# Patient Record
Sex: MALE | Race: WHITE | Employment: FULL TIME | URBAN - METROPOLITAN AREA
[De-identification: names, ages, dates, MRNs, and addresses within clinical notes are randomized per-mention and may not be internally consistent; named-entity substitution may affect disease eponyms.]

---

## 2018-03-14 ENCOUNTER — OFFICE VISIT (OUTPATIENT)
Dept: ORTHOPEDIC SURGERY | Age: 48
End: 2018-03-14

## 2018-03-14 VITALS
WEIGHT: 203.93 LBS | DIASTOLIC BLOOD PRESSURE: 77 MMHG | HEART RATE: 85 BPM | BODY MASS INDEX: 28.55 KG/M2 | SYSTOLIC BLOOD PRESSURE: 115 MMHG | HEIGHT: 71 IN

## 2018-03-14 DIAGNOSIS — M25.571 RIGHT ANKLE PAIN, UNSPECIFIED CHRONICITY: Primary | ICD-10-CM

## 2018-03-14 PROCEDURE — 99213 OFFICE O/P EST LOW 20 MIN: CPT | Performed by: PHYSICIAN ASSISTANT

## 2018-03-14 NOTE — PROGRESS NOTES
Subjective:       Jamie Juan is a 49 y.o. male No ref. provider found   for evaluation and treatment of an injury to the right ankle. This is evaluated as a personal injury. The injury occurred 6 hours ago. The patient states the ankle rolled inward at the time of injury. He did hear or sense a pop or snap at the time of the injury. The patient notes pain and mild swelling of the ankle since the injury. Pain is localized to the lateral  malleolar area. Description of injury/pain: Stepped out of his truck today and twisted his ankle. Immediate pain but has improved since injury. No n/t in the foot or leg. No knee pain. Moderate swelling noted. .         Patient's medications, allergies, past medical, surgical, social and family histories were reviewed and updated as appropriate. The pain assessment was noted & is as follows:        Objective:   Admission weight: 203 lb 14.8 oz (92.5 kg)  5' 10.98\" (180.3 cm)   Blood pressure 115/77, pulse 85, height 5' 10.98\" (1.803 m), weight 203 lb 14.8 oz (92.5 kg). General :    alert, appears stated age and cooperative   Gait:  Abnormal. The patient can bear weight on the injured extremity. Right Ankle  Proximal Fibula:   no tenderness noted   Edema:   moderate swelling of the lateral surface   Ecchymosis:   is observed in the right lateral  malleolus, right talofibular ligament    Active ROM:  50% of normal    Passive ROM:   50% of normal    Palpation:  moderate tenderness of the lateral surface   Stability.:   no joint laxity. Drawer sign equal to unaffected ankle.    Syndosmosis:   syndesmotic ligament is not tender   Sensation:    intact to light touch   Pulses:  normal DP and PT pulses     Contralateral Exam:  -No obvious deformities  -No abrasions or cellulitis noted, NVI   -Full ROM   -No joint laxity  -no palpable tenderness noted    Imaging  X-ray of the ankle/foot:   XR ANKLE RIGHT (MIN 3 VIEWS)   Final Result        no fracture or dislocation noted, soft tissue swelling    Assessment:     1: right grade 2 ATF sprain     Plan:      I discussed the following treatment options/plan of care:  rest the injured area as much as practical, apply ice packs, elevate the injured limb, compressive bandage  F/U 2 weeks for recheck if no better. No orders of the defined types were placed in this encounter.

## 2018-03-23 ENCOUNTER — TELEPHONE (OUTPATIENT)
Dept: ORTHOPEDIC SURGERY | Age: 48
End: 2018-03-23

## 2021-04-02 ENCOUNTER — HOSPITAL ENCOUNTER (INPATIENT)
Age: 51
LOS: 3 days | Discharge: HOME | DRG: 246 | End: 2021-04-05
Attending: EMERGENCY MEDICINE | Admitting: INTERNAL MEDICINE
Payer: COMMERCIAL

## 2021-04-02 ENCOUNTER — APPOINTMENT (OUTPATIENT)
Dept: CARDIAC CATH/INVASIVE PROCEDURES | Age: 51
DRG: 246 | End: 2021-04-02
Payer: COMMERCIAL

## 2021-04-02 ENCOUNTER — APPOINTMENT (OUTPATIENT)
Dept: GENERAL RADIOLOGY | Age: 51
DRG: 246 | End: 2021-04-02
Payer: COMMERCIAL

## 2021-04-02 DIAGNOSIS — I21.02 ST ELEVATION MYOCARDIAL INFARCTION INVOLVING LEFT ANTERIOR DESCENDING (LAD) CORONARY ARTERY (HCC): ICD-10-CM

## 2021-04-02 DIAGNOSIS — I21.3 ST ELEVATION MYOCARDIAL INFARCTION (STEMI), UNSPECIFIED ARTERY (HCC): Primary | ICD-10-CM

## 2021-04-02 LAB
A/G RATIO: 1.7 (ref 1.1–2.2)
ALBUMIN SERPL-MCNC: 4.3 G/DL (ref 3.4–5)
ALBUMIN SERPL-MCNC: 4.5 G/DL (ref 3.4–5)
ALP BLD-CCNC: 46 U/L (ref 40–129)
ALT SERPL-CCNC: 65 U/L (ref 10–40)
ANION GAP SERPL CALCULATED.3IONS-SCNC: 11 MMOL/L (ref 3–16)
ANION GAP SERPL CALCULATED.3IONS-SCNC: 16 MMOL/L (ref 3–16)
AST SERPL-CCNC: 49 U/L (ref 15–37)
BANDED NEUTROPHILS RELATIVE PERCENT: 1 % (ref 0–7)
BASOPHILS ABSOLUTE: 0.1 K/UL (ref 0–0.2)
BASOPHILS RELATIVE PERCENT: 1 %
BILIRUB SERPL-MCNC: 0.6 MG/DL (ref 0–1)
BUN BLDV-MCNC: 14 MG/DL (ref 7–20)
BUN BLDV-MCNC: 15 MG/DL (ref 7–20)
CALCIUM SERPL-MCNC: 9 MG/DL (ref 8.3–10.6)
CALCIUM SERPL-MCNC: 9.2 MG/DL (ref 8.3–10.6)
CHLORIDE BLD-SCNC: 100 MMOL/L (ref 99–110)
CHLORIDE BLD-SCNC: 100 MMOL/L (ref 99–110)
CO2: 23 MMOL/L (ref 21–32)
CO2: 24 MMOL/L (ref 21–32)
CREAT SERPL-MCNC: 0.8 MG/DL (ref 0.9–1.3)
CREAT SERPL-MCNC: 1 MG/DL (ref 0.9–1.3)
EKG ATRIAL RATE: 101 BPM
EKG ATRIAL RATE: 85 BPM
EKG ATRIAL RATE: 87 BPM
EKG DIAGNOSIS: NORMAL
EKG P AXIS: 34 DEGREES
EKG P AXIS: 39 DEGREES
EKG P-R INTERVAL: 158 MS
EKG P-R INTERVAL: 160 MS
EKG Q-T INTERVAL: 382 MS
EKG Q-T INTERVAL: 402 MS
EKG Q-T INTERVAL: 404 MS
EKG QRS DURATION: 106 MS
EKG QRS DURATION: 148 MS
EKG QRS DURATION: 94 MS
EKG QTC CALCULATION (BAZETT): 454 MS
EKG QTC CALCULATION (BAZETT): 483 MS
EKG QTC CALCULATION (BAZETT): 529 MS
EKG R AXIS: 83 DEGREES
EKG R AXIS: 84 DEGREES
EKG R AXIS: 98 DEGREES
EKG T AXIS: -23 DEGREES
EKG T AXIS: 29 DEGREES
EKG T AXIS: 91 DEGREES
EKG VENTRICULAR RATE: 103 BPM
EKG VENTRICULAR RATE: 85 BPM
EKG VENTRICULAR RATE: 87 BPM
EOSINOPHILS ABSOLUTE: 0.1 K/UL (ref 0–0.6)
EOSINOPHILS RELATIVE PERCENT: 2 %
GFR AFRICAN AMERICAN: >60
GFR AFRICAN AMERICAN: >60
GFR NON-AFRICAN AMERICAN: >60
GFR NON-AFRICAN AMERICAN: >60
GLOBULIN: 2.6 G/DL
GLUCOSE BLD-MCNC: 122 MG/DL (ref 70–99)
GLUCOSE BLD-MCNC: 150 MG/DL (ref 70–99)
HCT VFR BLD CALC: 42.5 % (ref 40.5–52.5)
HEMOGLOBIN: 15.1 G/DL (ref 13.5–17.5)
LYMPHOCYTES ABSOLUTE: 2.6 K/UL (ref 1–5.1)
LYMPHOCYTES RELATIVE PERCENT: 50 %
MAGNESIUM: 1.8 MG/DL (ref 1.8–2.4)
MAGNESIUM: 2.5 MG/DL (ref 1.8–2.4)
MCH RBC QN AUTO: 34.6 PG (ref 26–34)
MCHC RBC AUTO-ENTMCNC: 35.5 G/DL (ref 31–36)
MCV RBC AUTO: 97.4 FL (ref 80–100)
MONOCYTES ABSOLUTE: 0.3 K/UL (ref 0–1.3)
MONOCYTES RELATIVE PERCENT: 6 %
NEUTROPHILS ABSOLUTE: 2.1 K/UL (ref 1.7–7.7)
NEUTROPHILS RELATIVE PERCENT: 40 %
OVALOCYTES: ABNORMAL
PDW BLD-RTO: 15.1 % (ref 12.4–15.4)
PHOSPHORUS: 2.8 MG/DL (ref 2.5–4.9)
PLATELET # BLD: 260 K/UL (ref 135–450)
PMV BLD AUTO: 7.9 FL (ref 5–10.5)
POTASSIUM REFLEX MAGNESIUM: 3.3 MMOL/L (ref 3.5–5.1)
POTASSIUM SERPL-SCNC: 4.7 MMOL/L (ref 3.5–5.1)
RBC # BLD: 4.37 M/UL (ref 4.2–5.9)
SODIUM BLD-SCNC: 135 MMOL/L (ref 136–145)
SODIUM BLD-SCNC: 139 MMOL/L (ref 136–145)
TOTAL PROTEIN: 6.9 G/DL (ref 6.4–8.2)
TROPONIN: 3.01 NG/ML
TROPONIN: 7.46 NG/ML
TROPONIN: <0.01 NG/ML
VITAMIN D 25-HYDROXY: 27 NG/ML
WBC # BLD: 5.1 K/UL (ref 4–11)

## 2021-04-02 PROCEDURE — 85347 COAGULATION TIME ACTIVATED: CPT

## 2021-04-02 PROCEDURE — 2580000003 HC RX 258: Performed by: INTERNAL MEDICINE

## 2021-04-02 PROCEDURE — 92978 ENDOLUMINL IVUS OCT C 1ST: CPT | Performed by: INTERNAL MEDICINE

## 2021-04-02 PROCEDURE — 4A023N8 MEASUREMENT OF CARDIAC SAMPLING AND PRESSURE, BILATERAL, PERCUTANEOUS APPROACH: ICD-10-PCS | Performed by: INTERNAL MEDICINE

## 2021-04-02 PROCEDURE — 93005 ELECTROCARDIOGRAM TRACING: CPT

## 2021-04-02 PROCEDURE — 2500000003 HC RX 250 WO HCPCS

## 2021-04-02 PROCEDURE — 93005 ELECTROCARDIOGRAM TRACING: CPT | Performed by: INTERNAL MEDICINE

## 2021-04-02 PROCEDURE — 6360000002 HC RX W HCPCS

## 2021-04-02 PROCEDURE — 99152 MOD SED SAME PHYS/QHP 5/>YRS: CPT | Performed by: INTERNAL MEDICINE

## 2021-04-02 PROCEDURE — 93458 L HRT ARTERY/VENTRICLE ANGIO: CPT

## 2021-04-02 PROCEDURE — 80053 COMPREHEN METABOLIC PANEL: CPT

## 2021-04-02 PROCEDURE — 2709999900 HC NON-CHARGEABLE SUPPLY

## 2021-04-02 PROCEDURE — C1725 CATH, TRANSLUMIN NON-LASER: HCPCS

## 2021-04-02 PROCEDURE — 93458 L HRT ARTERY/VENTRICLE ANGIO: CPT | Performed by: INTERNAL MEDICINE

## 2021-04-02 PROCEDURE — 2720000010 HC SURG SUPPLY STERILE

## 2021-04-02 PROCEDURE — 6360000002 HC RX W HCPCS: Performed by: EMERGENCY MEDICINE

## 2021-04-02 PROCEDURE — 6370000000 HC RX 637 (ALT 250 FOR IP): Performed by: EMERGENCY MEDICINE

## 2021-04-02 PROCEDURE — 92978 ENDOLUMINL IVUS OCT C 1ST: CPT

## 2021-04-02 PROCEDURE — 99152 MOD SED SAME PHYS/QHP 5/>YRS: CPT

## 2021-04-02 PROCEDURE — 71045 X-RAY EXAM CHEST 1 VIEW: CPT

## 2021-04-02 PROCEDURE — 6360000002 HC RX W HCPCS: Performed by: INTERNAL MEDICINE

## 2021-04-02 PROCEDURE — 92973 PRQ TRLUML C MCHN ASP THRMBC: CPT

## 2021-04-02 PROCEDURE — 92941 PRQ TRLML REVSC TOT OCCL AMI: CPT | Performed by: INTERNAL MEDICINE

## 2021-04-02 PROCEDURE — 36415 COLL VENOUS BLD VENIPUNCTURE: CPT

## 2021-04-02 PROCEDURE — 93010 ELECTROCARDIOGRAM REPORT: CPT | Performed by: INTERNAL MEDICINE

## 2021-04-02 PROCEDURE — 82306 VITAMIN D 25 HYDROXY: CPT

## 2021-04-02 PROCEDURE — 5A2204Z RESTORATION OF CARDIAC RHYTHM, SINGLE: ICD-10-PCS | Performed by: INTERNAL MEDICINE

## 2021-04-02 PROCEDURE — C1874 STENT, COATED/COV W/DEL SYS: HCPCS

## 2021-04-02 PROCEDURE — B221Z2Z COMPUTERIZED TOMOGRAPHY (CT SCAN) OF MULTIPLE CORONARY ARTERIES USING INTRAVASCULAR OPTICAL COHERENCE: ICD-10-PCS | Performed by: INTERNAL MEDICINE

## 2021-04-02 PROCEDURE — 83735 ASSAY OF MAGNESIUM: CPT

## 2021-04-02 PROCEDURE — 93005 ELECTROCARDIOGRAM TRACING: CPT | Performed by: EMERGENCY MEDICINE

## 2021-04-02 PROCEDURE — 027034Z DILATION OF CORONARY ARTERY, ONE ARTERY WITH DRUG-ELUTING INTRALUMINAL DEVICE, PERCUTANEOUS APPROACH: ICD-10-PCS | Performed by: INTERNAL MEDICINE

## 2021-04-02 PROCEDURE — 2000000000 HC ICU R&B

## 2021-04-02 PROCEDURE — 6370000000 HC RX 637 (ALT 250 FOR IP): Performed by: INTERNAL MEDICINE

## 2021-04-02 PROCEDURE — C1887 CATHETER, GUIDING: HCPCS

## 2021-04-02 PROCEDURE — 6360000004 HC RX CONTRAST MEDICATION

## 2021-04-02 PROCEDURE — 85025 COMPLETE CBC W/AUTO DIFF WBC: CPT

## 2021-04-02 PROCEDURE — 99153 MOD SED SAME PHYS/QHP EA: CPT

## 2021-04-02 PROCEDURE — 99291 CRITICAL CARE FIRST HOUR: CPT | Performed by: INTERNAL MEDICINE

## 2021-04-02 PROCEDURE — 99285 EMERGENCY DEPT VISIT HI MDM: CPT

## 2021-04-02 PROCEDURE — C1769 GUIDE WIRE: HCPCS

## 2021-04-02 PROCEDURE — B2151ZZ FLUOROSCOPY OF LEFT HEART USING LOW OSMOLAR CONTRAST: ICD-10-PCS | Performed by: INTERNAL MEDICINE

## 2021-04-02 PROCEDURE — 92941 PRQ TRLML REVSC TOT OCCL AMI: CPT

## 2021-04-02 PROCEDURE — B2111ZZ FLUOROSCOPY OF MULTIPLE CORONARY ARTERIES USING LOW OSMOLAR CONTRAST: ICD-10-PCS | Performed by: INTERNAL MEDICINE

## 2021-04-02 PROCEDURE — 99284 EMERGENCY DEPT VISIT MOD MDM: CPT

## 2021-04-02 PROCEDURE — 96375 TX/PRO/DX INJ NEW DRUG ADDON: CPT

## 2021-04-02 PROCEDURE — 96374 THER/PROPH/DIAG INJ IV PUSH: CPT

## 2021-04-02 PROCEDURE — C1894 INTRO/SHEATH, NON-LASER: HCPCS

## 2021-04-02 PROCEDURE — 99255 IP/OBS CONSLTJ NEW/EST HI 80: CPT | Performed by: INTERNAL MEDICINE

## 2021-04-02 PROCEDURE — 84484 ASSAY OF TROPONIN QUANT: CPT

## 2021-04-02 RX ORDER — HEPARIN SODIUM 1000 [USP'U]/ML
4000 INJECTION, SOLUTION INTRAVENOUS; SUBCUTANEOUS ONCE
Status: DISCONTINUED | OUTPATIENT
Start: 2021-04-02 | End: 2021-04-02

## 2021-04-02 RX ORDER — HEPARIN SODIUM 1000 [USP'U]/ML
INJECTION, SOLUTION INTRAVENOUS; SUBCUTANEOUS
Status: COMPLETED | OUTPATIENT
Start: 2021-04-02 | End: 2021-04-02

## 2021-04-02 RX ORDER — HEPARIN SODIUM 1000 [USP'U]/ML
1000 INJECTION, SOLUTION INTRAVENOUS; SUBCUTANEOUS ONCE
Status: COMPLETED | OUTPATIENT
Start: 2021-04-02 | End: 2021-04-02

## 2021-04-02 RX ORDER — POTASSIUM CHLORIDE 20 MEQ/1
40 TABLET, EXTENDED RELEASE ORAL ONCE
Status: COMPLETED | OUTPATIENT
Start: 2021-04-02 | End: 2021-04-02

## 2021-04-02 RX ORDER — MIDAZOLAM HYDROCHLORIDE 5 MG/ML
INJECTION INTRAMUSCULAR; INTRAVENOUS
Status: COMPLETED | OUTPATIENT
Start: 2021-04-02 | End: 2021-04-02

## 2021-04-02 RX ORDER — CHLORAL HYDRATE 500 MG
CAPSULE ORAL DAILY
Status: ON HOLD | COMMUNITY
End: 2021-04-05 | Stop reason: HOSPADM

## 2021-04-02 RX ORDER — MORPHINE SULFATE 4 MG/ML
4 INJECTION, SOLUTION INTRAMUSCULAR; INTRAVENOUS ONCE
Status: COMPLETED | OUTPATIENT
Start: 2021-04-02 | End: 2021-04-02

## 2021-04-02 RX ORDER — HEPARIN SODIUM 1000 [USP'U]/ML
INJECTION, SOLUTION INTRAVENOUS; SUBCUTANEOUS
Status: DISCONTINUED
Start: 2021-04-02 | End: 2021-04-02

## 2021-04-02 RX ORDER — FENTANYL CITRATE 50 UG/ML
INJECTION, SOLUTION INTRAMUSCULAR; INTRAVENOUS
Status: COMPLETED | OUTPATIENT
Start: 2021-04-02 | End: 2021-04-02

## 2021-04-02 RX ORDER — MULTIVITAMIN
1 CAPSULE ORAL DAILY
COMMUNITY

## 2021-04-02 RX ORDER — ROSUVASTATIN CALCIUM 10 MG/1
20 TABLET, COATED ORAL NIGHTLY
Status: DISCONTINUED | OUTPATIENT
Start: 2021-04-02 | End: 2021-04-06 | Stop reason: HOSPADM

## 2021-04-02 RX ORDER — ASPIRIN 81 MG/1
81 TABLET, CHEWABLE ORAL DAILY
Status: DISCONTINUED | OUTPATIENT
Start: 2021-04-03 | End: 2021-04-06 | Stop reason: HOSPADM

## 2021-04-02 RX ORDER — SODIUM CHLORIDE 0.9 % (FLUSH) 0.9 %
10 SYRINGE (ML) INJECTION PRN
Status: DISCONTINUED | OUTPATIENT
Start: 2021-04-02 | End: 2021-04-06 | Stop reason: HOSPADM

## 2021-04-02 RX ORDER — SODIUM CHLORIDE 0.9 % (FLUSH) 0.9 %
10 SYRINGE (ML) INJECTION EVERY 12 HOURS SCHEDULED
Status: DISCONTINUED | OUTPATIENT
Start: 2021-04-02 | End: 2021-04-06 | Stop reason: HOSPADM

## 2021-04-02 RX ORDER — LISINOPRIL 5 MG/1
5 TABLET ORAL DAILY
Status: DISCONTINUED | OUTPATIENT
Start: 2021-04-02 | End: 2021-04-06 | Stop reason: HOSPADM

## 2021-04-02 RX ORDER — ONDANSETRON 2 MG/ML
4 INJECTION INTRAMUSCULAR; INTRAVENOUS EVERY 6 HOURS PRN
Status: DISCONTINUED | OUTPATIENT
Start: 2021-04-02 | End: 2021-04-06 | Stop reason: HOSPADM

## 2021-04-02 RX ORDER — EPTIFIBATIDE 0.75 MG/ML
2 INJECTION, SOLUTION INTRAVENOUS CONTINUOUS
Status: DISPENSED | OUTPATIENT
Start: 2021-04-02 | End: 2021-04-02

## 2021-04-02 RX ADMIN — EPTIFIBATIDE 2 MCG/KG/MIN: 0.75 INJECTION, SOLUTION INTRAVENOUS at 06:51

## 2021-04-02 RX ADMIN — MIDAZOLAM HYDROCHLORIDE 1 MG: 5 INJECTION INTRAMUSCULAR; INTRAVENOUS at 06:56

## 2021-04-02 RX ADMIN — MIDAZOLAM HYDROCHLORIDE 2 MG: 5 INJECTION INTRAMUSCULAR; INTRAVENOUS at 06:43

## 2021-04-02 RX ADMIN — ROSUVASTATIN CALCIUM 20 MG: 10 TABLET, FILM COATED ORAL at 20:39

## 2021-04-02 RX ADMIN — HEPARIN SODIUM 1000 UNITS: 1000 INJECTION, SOLUTION INTRAVENOUS; SUBCUTANEOUS at 06:03

## 2021-04-02 RX ADMIN — TICAGRELOR 90 MG: 90 TABLET ORAL at 20:38

## 2021-04-02 RX ADMIN — FENTANYL CITRATE 50 MCG: 50 INJECTION, SOLUTION INTRAMUSCULAR; INTRAVENOUS at 06:42

## 2021-04-02 RX ADMIN — TICAGRELOR 180 MG: 90 TABLET ORAL at 06:01

## 2021-04-02 RX ADMIN — METOPROLOL TARTRATE 25 MG: 25 TABLET, FILM COATED ORAL at 11:48

## 2021-04-02 RX ADMIN — MORPHINE SULFATE 4 MG: 4 INJECTION, SOLUTION INTRAMUSCULAR; INTRAVENOUS at 06:00

## 2021-04-02 RX ADMIN — HEPARIN SODIUM 1500 UNITS: 1000 INJECTION, SOLUTION INTRAVENOUS; SUBCUTANEOUS at 07:03

## 2021-04-02 RX ADMIN — LISINOPRIL 5 MG: 5 TABLET ORAL at 11:48

## 2021-04-02 RX ADMIN — ONDANSETRON HYDROCHLORIDE 4 MG: 2 SOLUTION INTRAMUSCULAR; INTRAVENOUS at 14:35

## 2021-04-02 RX ADMIN — FENTANYL CITRATE 25 MCG: 50 INJECTION, SOLUTION INTRAMUSCULAR; INTRAVENOUS at 07:02

## 2021-04-02 RX ADMIN — METOPROLOL TARTRATE 25 MG: 25 TABLET, FILM COATED ORAL at 20:39

## 2021-04-02 RX ADMIN — POTASSIUM CHLORIDE 40 MEQ: 1500 TABLET, EXTENDED RELEASE ORAL at 11:48

## 2021-04-02 RX ADMIN — SODIUM CHLORIDE, PRESERVATIVE FREE 10 ML: 5 INJECTION INTRAVENOUS at 14:37

## 2021-04-02 RX ADMIN — MIDAZOLAM HYDROCHLORIDE 1 MG: 5 INJECTION INTRAMUSCULAR; INTRAVENOUS at 07:02

## 2021-04-02 ASSESSMENT — PAIN SCALES - GENERAL
PAINLEVEL_OUTOF10: 10
PAINLEVEL_OUTOF10: 10

## 2021-04-02 NOTE — ED NOTES
[address] [facility] called  [address] returned page  4516 Clinical called to page CODE STEMI     Keren Alfaro  04/02/21 0546

## 2021-04-02 NOTE — ED PROVIDER NOTES
Patient arrived towards the end of my shift, however I was able to evaluate and hit him. We received a EKG prior to patient's arrival that was concerning for possible STEMI. Upon arrival, MS reports patient woke up with acute substernal chest pain. Patient took full dose aspirin prior to arrival and was given nitroglycerin on route. He states he has high blood pressure for which she takes medication for, negative cardiac history. Is not on a blood thinner. Vitals were stable. -Repeat EKG upon arrival here consistently shows elevation to anterior leads with reciprocal changes concerning for acute MI. Interventional cardiology was called, spoke with Dr. Baron Mancia who requested EKG be sent to him. In the meantime recommended activation of Cath Lab, 180 Brilinta, 4000 heparin and morphine. This was all given to the patient. After receiving the EKG, he states that it seems to be borderline, he is still on his way however is requesting any old EKG that we can find. I did send him the EKG that EMS had sent prior to his arrival.   Lab work as well as chest x-ray was ordered.  -Patient was handed off to Dr. Kaushik Steele, patient still in the ED awaiting interventional cardiology arrival to be taken to the Cath Lab.      Abelardo Mason MD  04/02/21 0045

## 2021-04-02 NOTE — OP NOTE
across AV None   Mitral regurg No signficant     Coronary Angiogram:  Artery Findings/Result   LM Ostial 30%   LAD Ostial calcium, 80% prox and 90% prox/mid and KAUR-1 flow   LCx Luminals  OM1- very large vessel, luminals       RCA Dominant, high downward takeoff, PDA kink gnosis         Results of intervention     Lesion 1: prox LAD  Stent: Resolute Blain 3.5 x 38  Pre:   90% stenosis, KAUR 1 flow  Post: 0% stenosis, KAUR 3 flow  Suspect Diag 1 jailed and lost    Assessment/Plan  1. Successful percutaneous intervention to the proximal and mid LAD for plaque rupture event. Doing 1 drug-eluting stent. This was further optimized by OCT imaging. 2.  Note there is a hazy spot in the ostial LAD that under OCT imaging was proven to be fibroatheromatous disease. There is no critical stenosis, no dissection no thrombus. 3.  Integrilin x6 hours. 4.  Aspirin 81 mg p.o. daily, ticagrelor 90 mg p.o. twice daily for 1 year minimum  5. We will start beta-blocker, high-dose statin, cardiac rehab phase 1 and 2, echocardiogram  6. Of note patient is on experimental chemotherapy we will therefore notify the study coordinators.   -Note this study drug supposedly affects QTC prolongation. I did not appreciate any QT prolongation on initial EKG but potassium was low at 3.3 will need to be repleted. Med Rec:   Recommendation Indicated?  Not Given Due To: Note   DAPT       STATIN - HIGH DOSE      BETA BLOCKER      ACE/ARB/ARNI      ALDACTONE          Electronically signed by Julita Zamora MD on 4/2/2021 at 7:58 AM

## 2021-04-02 NOTE — ED PROVIDER NOTES
Emergency Physician Note    Chief Complaint  Chest Pain       History of Present Illness  Jose Langford is a 45 y.o. male who presents to the ED for chest pain. Patient reports that he woke up about 40 minutes prior to arrival with severe substernal chest pain that does not radiate. He was diaphoretic and short of breath and nauseated. No cardiac history. He is on an experimental drug for multiple endocrine neoplasia. He takes that drug daily as prescribed. He also states he takes medicine for hypertension and hyperlipidemia as prescribed. No history of diabetes or smoking. No family history of early heart disease. He had a negative stress test 3 to 4 years ago by his recollection. He was transported by EMS and was given 325 mg aspirin and nitroglycerin in route. 10 systems reviewed, pertinent positives per HPI otherwise noted to be negative    I have reviewed the following from the nursing documentation:      Prior to Admission medications    Medication Sig Start Date End Date Taking?  Authorizing Provider   Multiple Vitamin (MULTIVITAMIN) capsule Take 1 capsule by mouth daily    Historical Provider, MD   Multiple Vitamins-Iron (MULTIPLE VITAMIN/IRON PO) Take by mouth    Historical Provider, MD   Omega-3 Fatty Acids (FISH OIL) 1000 MG CAPS by NOT APPLICABLE route    Historical Provider, MD       Allergies as of 04/02/2021 - Review Complete 04/02/2021   Allergen Reaction Noted    Zocor [simvastatin] Other (See Comments) 05/12/2016       Past Medical History:   Diagnosis Date    Endocrine cancer ([facility] [address]     Hyperlipidemia     Tumor cells 4/18/16    [place] Endocrine Tumor- in Stomach        Surgical History:   Past Surgical History:   Procedure Laterality Date    COLONOSCOPY  5/12/16    polyps    KNEE SURGERY  2012    LIVER BIOPSY  5/10/16    CT guided    UPPER GASTROINTESTINAL ENDOSCOPY  4/18/2016        Family History:    Family History   Problem Relation Age of Onset    High Cholesterol Mother    Maryuri Walker Heart Disease Mother     High Cholesterol Father     Asthma Sister     Cancer Maternal Grandfather         Possible leukemia       Social History     Socioeconomic History    Marital status:      Spouse name: Not on file    Number of children: Not on file    Years of education: Not on file    Highest education level: Not on file   Occupational History    Not on file   Social Needs    Financial resource strain: Not on file    Food insecurity     Worry: Not on file     Inability: Not on file   [employer] needs     Medical: Not on file     Non-medical: Not on file   Tobacco Use    Smoking status: Never Smoker    Smokeless tobacco: Never Used   Substance and Sexual Activity    Alcohol use: Yes     Alcohol/week: 0.0 standard drinks     Comment: socially weekends 5 beers on weekend    Drug use: No    Sexual activity: Not on file   Lifestyle    Physical activity     Days per week: Not on file     Minutes per session: Not on file    Stress: Not on file   Relationships    Social connections     Talks on phone: Not on file     Gets together: Not on file     Attends Congregation service: Not on file     Active member of club or organization: Not on file     Attends meetings of clubs or organizations: Not on file     Relationship status: Not on file    Intimate partner violence     Fear of current or ex partner: Not on file     Emotionally abused: Not on file     Physically abused: Not on file     Forced sexual activity: Not on file   Other Topics Concern    Not on file   Social History Narrative    Not on file       Nursing notes reviewed. ED Triage Vitals [04/02/21 0544]   Enc Vitals Group      BP (!) 149/113      Pulse 87      Resp 24      Temp 98 °F (36.7 °C)      Temp Source Oral      SpO2 99 %      Weight 212 lb (96.2 kg)      Height 5' 11\" (1.803 m)      Head Circumference       Peak Flow       Pain Score       Pain Loc       Pain Edu? Excl. in [address]? GENERAL:  Awake, alert. Well developed, well nourished with no apparent distress. HENT:  Normocephalic, Atraumatic, moist mucous membranes. EYES:  Pupils equal round and reactive to light, Conjunctiva normal, extraocular movements normal.  NECK:  No meningeal signs, Supple. CHEST:  Regular rate and rhythm, chest wall non-tender. LUNGS:  Clear to auscultation bilaterally. ABDOMEN:  Soft, non-tender, no rebound, rigidity or guarding, non-distended, normal bowel sounds. No costovertebral angle tenderness to palpation. BACK:  No tenderness. EXTREMITIES:  Normal range of motion, no edema, no bony tenderness, no deformity, distal pulses present. SKIN: Warm, dry and intact. NEUROLOGIC: Normal mental status. Moving all extremities to command. LABS and DIAGNOSTIC RESULTS  EKG  The Ekg interpreted by me shows  normal sinus rhythm with a rate of 87  Axis is   Normal  QTc is  normal  Intervals and Durations are unremarkable. ST Segments: elevation in  maggi-lateral leads  Delta waves, Brugada Syndrome, and Short [place] are not present. No prior EKG available for comparison. RADIOLOGY  X-RAYS:  I have reviewed radiologic plain film image(s). ALL OTHER NON-PLAIN FILM IMAGES SUCH AS CT, ULTRASOUND AND MRI HAVE BEEN READ BY THE RADIOLOGIST. XR CHEST PORTABLE   Final Result   Low lung volumes with no acute process identified.               LABS  Labs Reviewed   CBC WITH AUTO DIFFERENTIAL - Abnormal; Notable for the following components:       Result Value    MCH 34.6 (*)     All other components within normal limits    Narrative:     Performed at:  [address],  [place], [address] [email or web address]   Phone [phone]   COMPREHENSIVE METABOLIC PANEL W/ REFLEX TO MG FOR LOW K - Abnormal; Notable for the following components:    Potassium reflex Magnesium 3.3 (*)     Glucose 150 (*)     ALT 65 (*)     AST 49 (*)     All other components within normal limits    Narrative:     Performed at:  [facility]  [address],  [place], [place]   Phone [phone]   TROPONIN    Narrative:     Performed at:  [facility]  [address],  [place], [place]   Phone [phone]   MAGNESIUM    Narrative:     Performed at:  [facility]) [address],  [place], [place]   Phone [phone]       PROCEDURES  Cardiac defibrillation x6 for torsades de pointes. MEDICAL DECISION MAKING        Patient arrived shortly before 6 AM and the overnight physician, Dr. Rachel Flynn, initially evaluated him and obtained an EKG that showed ST elevation. She consulted Dr. Antwon Will and he was in route to the emergency department at the time I arrived. I assumed care at that moment. He had already received heparin and aspirin and nitroglycerin. When I entered the room I saw that his monitor was displaying multiple ventricular and I instructed the nurse to give 5 metoprolol ectopic beats and we opened up the crash cart. The patient already had pads in place. Shortly thereafter he went into a V. tach arrest and was defibrillated with 360 J synchronous cardioversion. This was immediately successful. Patient immediately returned with a pulse and mental status. He continued to talk and we continue to perform care for him. At around this time Dr. Antwon Will came to the bedside and advised giving lidocaine bolus followed by drip. I administered a 50 mg IV bolus of lidocaine and the nurse started a drip. We then also gave the patient some amiodarone. He proceeded then to have a total of 6 V. tach/torsades episodes which were treated with defibrillation successfully each time. During the course of this resuscitation he received 2g magnesium sulfate IV push as well as a total of 300 mg of amiodarone IV. He also received morphine and Zofran 4 mg apiece.   I considered whether or not to intubate the patient but given that his mental status returned to normal and he had respiratory reflexes intact and was in fact verbal between these episodes I elected against it because I felt that his cardiac stability could be harmed by this. Is very tenuous at that time. In my opinion nothing to delay his transfer to cardiac cath lab should be done. The total Critical Care time is 20 minutes which excludes separately billable procedures. The critical care was concerning management of torsade de pointes and STEMI with multiple IV medications. This time is exclusive of any time documented by any other providers. I spoke with Dr. Ismael Johnson. We thoroughly discussed the history, physical exam, laboratory and imaging studies, as well as, emergency department course. Based upon that discussion, we've decided to admit Marcin Lazos for further emergent cardiac catheterization. As I have deemed necessary from their history, physical, and studies, I have considered and evaluated Marcin Lazos for the following diagnoses:        FINAL IMPRESSION  1. ST elevation myocardial infarction (STEMI), unspecified artery ([address]        Vitals:  Blood pressure (!) 138/118, pulse 107, temperature 98 °F (36.7 °C), temperature source Oral, resp. rate 18, height 5' 11\" (1.803 m), weight 212 lb (96.2 kg), SpO2 99 %. Disposition  Pt is in critical condition upon Transfer to cath lab. This chart was generated using the [employer] dictation system. I created this record but it may contain dictation errors.           Linda Diaz MD  04/02/21 4934

## 2021-04-02 NOTE — H&P
Laterality Date    COLONOSCOPY  5/12/16    polyps    KNEE SURGERY  2012    LIVER BIOPSY  5/10/16    CT guided    UPPER GASTROINTESTINAL ENDOSCOPY  4/18/2016       Medications Prior to Admission:      Prior to Admission medications    Medication Sig Start Date End Date Taking? Authorizing Provider   Multiple Vitamin (MULTIVITAMIN) capsule Take 1 capsule by mouth daily    Historical Provider, MD   Multiple Vitamins-Iron (MULTIPLE VITAMIN/IRON PO) Take by mouth    Historical Provider, MD   Omega-3 Fatty Acids (FISH OIL) 1000 MG CAPS by NOT APPLICABLE route    Historical Provider, MD       Allergies:  Zocor [simvastatin]    Social History:      The patient currently lives at home    TOBACCO:   reports that he has never smoked. He has never used smokeless tobacco.  ETOH:   reports current alcohol use. E-Cigarettes/Vaping Use     Questions Responses    E-Cigarette/Vaping Use     Start Date     Passive Exposure     Quit Date     Counseling Given     Comments             Family History:      Reviewed in detail and negative for DM, CAD, Cancer, CVA. Positive as follows:        Problem Relation Age of Onset    High Cholesterol Mother     Heart Disease Mother     High Cholesterol Father     Asthma Sister     Cancer Maternal Grandfather         Possible leukemia       REVIEW OF SYSTEMS:   Pertinent positives as noted in the HPI. Chest pain. All other systems reviewed and negative. PHYSICAL EXAM PERFORMED:    BP (!) 138/118   Pulse 107   Temp 98 °F (36.7 °C) (Oral)   Resp 18   Ht 5' 11\" (1.803 m)   Wt 212 lb (96.2 kg)   SpO2 99%   BMI 29.57 kg/m²     General appearance:  No apparent distress, appears stated age and cooperative. HEENT:  Normal cephalic, atraumatic without obvious deformity. Pupils equal, round, and reactive to light. Extra ocular muscles intact. Conjunctivae/corneas clear. Neck: Supple, with full range of motion. No jugular venous distention. Trachea midline.   Respiratory:  Normal respiratory effort. Clear to auscultation, bilaterally without Rales/Wheezes/Rhonchi. Cardiovascular:  Regular rate and rhythm with normal S1/S2 without murmurs, rubs or gallops. Abdomen: Soft, non-tender, non-distended with normal bowel sounds. Musculoskeletal:  No clubbing, cyanosis or edema bilaterally. Full range of motion without deformity. Skin: Skin color, texture, turgor normal.  No rashes or lesions. Neurologic:  Neurovascularly intact without any focal sensory/motor deficits. Cranial nerves: II-XII intact, grossly non-focal.  Psychiatric:  Alert and oriented, thought content appropriate, normal insight  Capillary Refill: Brisk,< 3 seconds   Peripheral Pulses: +2 palpable, equal bilaterally       Labs:     Recent Labs     04/02/21  0557   WBC 5.1   HGB 15.1   HCT 42.5        Recent Labs     04/02/21  0557      K 3.3*      CO2 23   BUN 15   CREATININE 1.0   CALCIUM 9.2     Recent Labs     04/02/21  0557   AST 49*   ALT 65*   BILITOT 0.6   ALKPHOS 46     No results for input(s): INR in the last 72 hours. Recent Labs     04/02/21  0557   TROPONINI <0.01       Urinalysis:    No results found for: Joel Miss, [address], BACTERIA, RBCUA, BLOODU, [address], GLUCOSEU    Radiology:     CXR: I have reviewed the CXR with the following interpretation: Low lung volumes  EKG:  I have reviewed the EKG with the following interpretation: ST elevation myocardial infarction on arrival    XR CHEST PORTABLE   Final Result   Low lung volumes with no acute process identified.              ASSESSMENT:    Active Hospital Problems    Diagnosis Date Noted    [facility] [I21.3] 04/02/2021    Coronary artery disease [I25.10]     Ischemic cardiomyopathy [I25.5]     ST elevation myocardial infarction involving left anterior descending (LAD) coronary artery (HCC) [I21.02]     [facility] [C7A.8] 04/21/2016         PLAN:    ST elevation myocardial infarction  Had PCI with stent. Currently pain-free. Cardiology to follow for post intervention care. Medications are being adjusted. Will remain in the ICU at least overnight    Arrhythmia  Probably secondary to acute ischemia, possibly contributed by hypokalemia. Resolved with antiarrhythmics and cardioversion and has not recurred after PCI. Continue to monitor. Replete electrolytes as indicated. Ischemic cardiomyopathy  LAD plaque rupture noted. Left ventricular ejection fraction was around 20 to 25% during cardiac cath. Expected to improve after revascularization. Repeat echo tomorrow per cardiology. No current shortness of breath. Asymptomatic at this time    Neuroendocrine cancer  Patient is on experimental drug, which may prolong QTC according to preliminary information. Cardiologist has not noted QTC prolongation when he assessed the patient. At this point, continue same management per study protocol. We will not interfere. Discussed with cardiology    Discussed with the patient. Questions answered      DVT Prophylaxis: Lovenox after post procedure infusions are completed  Diet: DIET CARDIAC;  Code Status: Full Code    PT/OT Eval Status: Consider tomorrow, depending on overall strength. Dispo -inpatient stay. In the ICU at least overnight. Jarrod Kim MD    Thank you Zackery Madison for the opportunity to be involved in this patient's care. If you have any questions or concerns please feel free to contact me at [phone].

## 2021-04-02 NOTE — ED NOTES
Pt in torsades and fib rhythm. .  Given 2 grams mag. . pt was shocked 6 times during critical care treatment. .5 mg metoprolol. Elaina Sterling given 50 lidocaine and a lidocaine drip at 2 mg per minute was started. . for pain control a total of 8 mg of morphine was given. .Vance Katayama was given orally to pt who tolerated well. ..150 mg of amiodarone was given x2 . .4 mg of Zofran was given for nausea. Multiple iv access points were established.      Janett Hughes RN  04/02/21 166 Trena Sheffield RN  04/02/21 9173

## 2021-04-02 NOTE — CONSULTS
[facility]  [phone]      Attending Physician: Rahul Bonner MD  Reason for Consultation/Chief Complaint: STEMI    Subjective   History of Present Illness:  Marisol Cohen is a 45 y.o. patient who presented to the hospital with complaints of CP. Apparently patient has had no preceding symptoms. He had chest pain that woke him from sleep that was severe 10 out of 10 crushing in nature. He immediately called EMS. Patient was brought to [facility] in emergency lites and signed state. Of note patient's wife does work in our office and I contacted fellow colleagues who contacted me at home prior to the emergency department. I called the ER to obtain history from the overnight ER physician EKG was sent from his. I went ahead and activated the Cath Lab based on the above. In the emergency room once I arrived I found out patient had had one episode of torsades de pointes and cardioverted successfully without CPR. In the emergency room he went through a number of additional VT episodes and was cardioverted. He did not require any chest compressions. He did start lidocaine and amiodarone boluses and drips. He was given morphine for pain as he was shocked awake prior to him becoming unconscious. The Cath Lab staff arrived he was urgently rushed to the Cath Lab for evaluation      Past Medical History:   has a past medical history of Endocrine cancer ([address] Hyperlipidemia, and Tumor cells. Surgical History:   has a past surgical history that includes knee surgery (2012); Upper gastrointestinal endoscopy (4/18/2016); liver biopsy (5/10/16); and Colonoscopy (5/12/16). Social History:   reports that he has never smoked. He has never used smokeless tobacco. He reports current alcohol use. He reports that he does not use drugs.      Family History:  family history includes Asthma in his sister; Cancer in his maternal grandfather; Heart Disease in his mother; High Cholesterol in his father and mother. Home Medications:  Were reviewed and are listed in nursing record and/or below  Prior to Admission medications    Medication Sig Start Date End Date Taking? Authorizing Provider   Multiple Vitamin (MULTIVITAMIN) capsule Take 1 capsule by mouth daily    Historical Provider, MD   Multiple Vitamins-Iron (MULTIPLE VITAMIN/IRON PO) Take by mouth    Historical Provider, MD   Omega-3 Fatty Acids (FISH OIL) 1000 MG CAPS by NOT APPLICABLE route    Historical Provider, MD        CURRENT Medications:  heparin (porcine) injection 4,000 Units, Once  heparin (porcine) 1000 UNIT/ML injection,   eptifibatide (INTEGRILIN) 0.75 mg/mL infusion, Continuous  sodium chloride flush 0.9 % injection 10 mL, 2 times per day  sodium chloride flush 0.9 % injection 10 mL, PRN  metoprolol tartrate (LOPRESSOR) tablet 25 mg, BID  lisinopril (PRINIVIL;ZESTRIL) tablet 5 mg, Daily  rosuvastatin (CRESTOR) tablet 20 mg, Nightly  [START ON 4/3/2021] aspirin chewable tablet 81 mg, Daily  ticagrelor (BRILINTA) tablet 90 mg, BID        Allergies:  Zocor [simvastatin]     Review of Systems:   A 14 point review of symptoms completed. Pertinent positives identified in the HPI, all other review of symptoms negative as below.       Objective   PHYSICAL EXAM:    Vitals:    04/02/21 0804   BP: 120/77   Pulse: 88   Resp: 15   Temp: 98 °F (36.7 °C)   SpO2: 96%    Weight: 212 lb (96.2 kg)         General Appearance:  Alert, cooperative, moderate distress, appears stated age   Head:  Normocephalic, without obvious abnormality, atraumatic   Eyes:  PERRL, conjunctiva/corneas clear   Nose: Nares normal, no drainage or sinus tenderness   Throat: Lips, mucosa, and tongue normal   Neck: Supple, symmetrical, trachea midline, no adenopathy, thyroid: not enlarged, symmetric, no tenderness/mass/nodules, no carotid bruit or JVD   Lungs:   Clear to auscultation bilaterally, respirations unlabored   Chest Wall:  No deformity or tenderness Heart:  Regular rate and tacky rhythm, S1, S2 normal, no murmur, rub or gallop   Abdomen:   Soft, non-tender, bowel sounds active all four quadrants,  no masses, no organomegaly   Extremities: Extremities normal, atraumatic, no cyanosis or edema   Pulses: 2+ and symmetric   Skin: Skin color, texture, turgor normal, no rashes or lesions   Pysch: Normal mood and affect   Neurologic: Normal gross motor and sensory exam.         Labs   CBC:   Lab Results   Component Value Date    WBC 5.1 2021    RBC 4.37 2021    RBC 5.31 2016    HGB 15.1 2021    HCT 42.5 2021    MCV 97.4 2021    RDW 15.1 2021     2021     CMP:  Lab Results   Component Value Date     2021    K 3.3 2021     2021    CO2 23 2021    BUN 15 2021    CREATININE 1.0 2021    GFRAA >60 2021    AGRATIO 1.7 2021    LABGLOM >60 2021    GLUCOSE 150 2021    GLUCOSE 94 2016    PROT 6.9 2021    PROT 7.9 2016    CALCIUM 9.2 2021    BILITOT 0.6 2021    ALKPHOS 46 2021    AST 49 2021    ALT 65 2021     PT/INR:  No results found for: PTINR  HgBA1c:No results found for: LABA1C  Lab Results   Component Value Date    TROPONINI <0.01 2021         Cardiac Data     Last EK2021 at 05 45. Sinus rhythm with anterior ST elevation and some reciprocal depressions. Echo:    Stress Test:    Cath:    Studies:   CXR:Low lung volumes with no acute process identified. Assessment and Plan      1. Acute anterior MI  2. VT arrest  3. History of neuroendocrine tumor      Plan  1. Emergent left heart catheterization    Cardiology Consult ([id number]) Total critical care time was 46 minutes, excluding separately reportable procedures.  Services, included in critical care time were chart data review, documentation time, obtaining info from patient, review of nursing notes, and vital sign assessment and management of the patient. Performing defibrillation and ACLS in the emergency room as well as upon transport to the Cath Lab. Speaking with family. There was a high probability of clinically significant life-threatening deterioration in the patient's condition, which required my urgent intervention. Patient Active Problem List   Diagnosis    [facility]endocrine [facility] ([facility] [address]    Coronary artery disease    Ischemic cardiomyopathy    ST elevation myocardial infarction involving left anterior descending (LAD) coronary artery ([facility]    STEMI (ST elevation myocardial infarction) ([facility]           Thank you for allowing us to participate in the care of [employer]. Please call me with any questions [phone]. Miesha Meza MD, [facility] Cardiologist  [facility]  [phone] [facility]  [phone] [facility]  4/2/2021 8:25 AM    I will address the patient's cardiac risk factors and adjusted pharmacologic treatment as needed. In addition, I have reinforced the need for patient directed risk factor modification. All questions and concerns were addressed to the patient/family. Alternatives to my treatment were discussed. The note was completed using EMR. Every effort was made to ensure accuracy; however, inadvertent computerized transcription errors may be present.

## 2021-04-02 NOTE — PROGRESS NOTES
ACT drawn and resulted 159. Patient currently on integrilin gtt at 15.4  ML/hr. Patient instructed on removal procedure. Arterial sheath site without hematoma or oozing. Arterial sheath removed per policy without difficulty. Integrity of sheath inspected upon removal and no abnormalities noted. Manual pressure held X 20 minutes. Dry, sterile tegaderm applied. Patient tolerated well. Vital signs, groin checks, and pedal pulses will be completed per protocol (every 15 minutes X 4, every 30 minutes X 2, and every hour X 2 per protocol). Sheath removed by:  Tanya Boyd RN and Joselin Hodge RN.

## 2021-04-02 NOTE — PROGRESS NOTES
Note patient is on an investigational drug Cabozantinib. Per his wallet card this does show that is associated with changes in the CYP 3 A4 enzyme that can trigger QTC prolongation. Study Dr. Dr. Yvette Dutton was notified of this acute MI event. However the time of presentation his QTC was not prolonged on admission and does not appear prolonged now however I am awaiting a post MI EKG.

## 2021-04-03 LAB
ANION GAP SERPL CALCULATED.3IONS-SCNC: 11 MMOL/L (ref 3–16)
BUN BLDV-MCNC: 13 MG/DL (ref 7–20)
CALCIUM SERPL-MCNC: 9.1 MG/DL (ref 8.3–10.6)
CHLORIDE BLD-SCNC: 98 MMOL/L (ref 99–110)
CHOLESTEROL, TOTAL: 224 MG/DL (ref 0–199)
CO2: 27 MMOL/L (ref 21–32)
CREAT SERPL-MCNC: 0.9 MG/DL (ref 0.9–1.3)
GFR AFRICAN AMERICAN: >60
GFR NON-AFRICAN AMERICAN: >60
GLUCOSE BLD-MCNC: 156 MG/DL (ref 70–99)
HCT VFR BLD CALC: 45.1 % (ref 40.5–52.5)
HDLC SERPL-MCNC: 38 MG/DL (ref 40–60)
HEMOGLOBIN: 15.6 G/DL (ref 13.5–17.5)
LDL CHOLESTEROL CALCULATED: 152 MG/DL
LV EF: 33 %
LVEF MODALITY: NORMAL
MCH RBC QN AUTO: 34.4 PG (ref 26–34)
MCHC RBC AUTO-ENTMCNC: 34.6 G/DL (ref 31–36)
MCV RBC AUTO: 99.5 FL (ref 80–100)
PDW BLD-RTO: 15.5 % (ref 12.4–15.4)
PLATELET # BLD: 242 K/UL (ref 135–450)
PMV BLD AUTO: 7.5 FL (ref 5–10.5)
POTASSIUM SERPL-SCNC: 4.6 MMOL/L (ref 3.5–5.1)
RBC # BLD: 4.53 M/UL (ref 4.2–5.9)
SODIUM BLD-SCNC: 136 MMOL/L (ref 136–145)
TRIGL SERPL-MCNC: 170 MG/DL (ref 0–150)
TROPONIN: 5.39 NG/ML
VLDLC SERPL CALC-MCNC: 34 MG/DL
WBC # BLD: 7.3 K/UL (ref 4–11)

## 2021-04-03 PROCEDURE — C8929 TTE W OR WO FOL WCON,DOPPLER: HCPCS

## 2021-04-03 PROCEDURE — 80048 BASIC METABOLIC PNL TOTAL CA: CPT

## 2021-04-03 PROCEDURE — 6360000002 HC RX W HCPCS: Performed by: INTERNAL MEDICINE

## 2021-04-03 PROCEDURE — 99233 SBSQ HOSP IP/OBS HIGH 50: CPT | Performed by: INTERNAL MEDICINE

## 2021-04-03 PROCEDURE — 2000000000 HC ICU R&B

## 2021-04-03 PROCEDURE — 84484 ASSAY OF TROPONIN QUANT: CPT

## 2021-04-03 PROCEDURE — 6370000000 HC RX 637 (ALT 250 FOR IP): Performed by: INTERNAL MEDICINE

## 2021-04-03 PROCEDURE — 36415 COLL VENOUS BLD VENIPUNCTURE: CPT

## 2021-04-03 PROCEDURE — 2580000003 HC RX 258: Performed by: INTERNAL MEDICINE

## 2021-04-03 PROCEDURE — 85027 COMPLETE CBC AUTOMATED: CPT

## 2021-04-03 PROCEDURE — 80061 LIPID PANEL: CPT

## 2021-04-03 RX ADMIN — ENOXAPARIN SODIUM 40 MG: 40 INJECTION SUBCUTANEOUS at 12:33

## 2021-04-03 RX ADMIN — TICAGRELOR 90 MG: 90 TABLET ORAL at 21:34

## 2021-04-03 RX ADMIN — LISINOPRIL 5 MG: 5 TABLET ORAL at 08:40

## 2021-04-03 RX ADMIN — SODIUM CHLORIDE, PRESERVATIVE FREE 10 ML: 5 INJECTION INTRAVENOUS at 10:26

## 2021-04-03 RX ADMIN — MUPIROCIN: 20 OINTMENT TOPICAL at 08:41

## 2021-04-03 RX ADMIN — TICAGRELOR 90 MG: 90 TABLET ORAL at 08:40

## 2021-04-03 RX ADMIN — MUPIROCIN: 20 OINTMENT TOPICAL at 21:38

## 2021-04-03 RX ADMIN — METOPROLOL TARTRATE 25 MG: 25 TABLET, FILM COATED ORAL at 08:40

## 2021-04-03 RX ADMIN — ASPIRIN 81 MG: 81 TABLET, CHEWABLE ORAL at 08:40

## 2021-04-03 RX ADMIN — ROSUVASTATIN CALCIUM 20 MG: 10 TABLET, FILM COATED ORAL at 21:34

## 2021-04-03 RX ADMIN — SODIUM CHLORIDE, PRESERVATIVE FREE 10 ML: 5 INJECTION INTRAVENOUS at 21:38

## 2021-04-03 ASSESSMENT — PAIN SCALES - GENERAL: PAINLEVEL_OUTOF10: 0

## 2021-04-03 NOTE — PROGRESS NOTES
normal rate, normal S1S2, no murmur, rub or gallop  · Palpation:  Nl PMI  · JVP:  normal  · Extremities: no edema  · Cath site incision bandage and post-procedure stable. Normal pulse. No erythema or drainage  Abdomen:  · Soft, non-tender  · Normal bowel sounds  Extremities:  ·  No Cyanosis or Clubbing  Neurological/Psychiatric:  · Oriented to time, place, and person  · Non-anxious   Skin Warm and dry    Rhythm - NSR and no arrhthymias noted      Assessment: 1. Ischemic heart disease   ~post cath for anterior ST segment elevation myocardial infarction  2. Ischemic cardiomyopathy  3. Torsades    Plan:  1. The patient should be treated with the following   ~asa 81mg daily   ~beta-blockers to target HR 60-80 and -130   ~high intensity statin therapy with atorvastatin or rosuvastatin   ~anti-anginal therapy as necessary  2. DAPT as clinically indicated*  3. The patient was seen for >25 minutes. >50% of the time was devoted to giving the patient detailed instructions instructions on addressing diet, regular exercise, weight control, smoking abstention, medication compliance, and stress minimization. The patient was provided written and verbal instructions regarding risk factor modification. 4.  Echocardiogram  5. Correct any electrolyte issues with magnesium and potassium to goal      All questions and concerns were addressed to the patient/family. Alternatives to my treatment were discussed. The note was completed using EMR. Every effort was made to ensure accuracy; however, inadvertent computerized transcription errors may be present.     Dung Canales MD, FADUMO, [facility], [address] [place] [address]  4/3/2021 2:24 PM

## 2021-04-03 NOTE — PROGRESS NOTES
Hospitalist Progress Note      PCP: Nando Power    Date of Admission: 4/2/2021    Chief Complaint: Chest pain    Hospital Course: The patient woke up with crushing chest pain, around 5 AM.  Called 911 and was transported to [facility]. EKG at that time showed ST elevation myocardial infarction. While he was waiting to go to Cath Lab, patient developed arrhythmia. It was torsades and ventricular fibrillation. Had cardioversion six times. Was given magnesium metoprolol and lidocaine, then lidocaine drip was started. 150 mg of amiodarone was also administered. Once the heart rhythm stabilized, patient was taken to cardiac cath. Ruptured LAD plaque was diagnosed and patient had PCI followed by a drug-eluting stent insertion. Transferred to ICU for postprocedure care. Doing fine overnight. No evidence of QTC prolongation throughout the follow-up. Pain-free at the time of this visit    Subjective: No chest pain, no shortness of breath, no nausea or vomiting      Medications:  Reviewed    Infusion Medications   Scheduled Medications    sodium chloride flush  10 mL Intravenous 2 times per day    metoprolol tartrate  25 mg Oral BID    lisinopril  5 mg Oral Daily    rosuvastatin  20 mg Oral Nightly    aspirin  81 mg Oral Daily    ticagrelor  90 mg Oral BID    mupirocin   Nasal BID     PRN Meds: sodium chloride flush, ondansetron, perflutren lipid microspheres      Intake/Output Summary (Last 24 hours) at 4/3/2021 1112  Last data filed at 4/3/2021 0300  Gross per 24 hour   Intake 200 ml   Output 1600 ml   Net -1400 ml       Physical Exam Performed:    /72   Pulse 80   Temp 98.2 °F (36.8 °C) (Oral)   Resp 25   Ht 5' 11\" (1.803 m)   Wt 212 lb (96.2 kg)   SpO2 98%   BMI 29.57 kg/m²     General appearance: No apparent distress, appears stated age and cooperative. HEENT: Pupils equal, round, and reactive to light. Conjunctivae/corneas clear. Neck: Supple, with full range of motion. No jugular venous distention. Trachea midline. Respiratory:  Normal respiratory effort. Clear to auscultation, bilaterally without Rales/Wheezes/Rhonchi. Cardiovascular: Regular rate and rhythm with normal S1/S2 without murmurs, rubs or gallops. Abdomen: Soft, non-tender, non-distended with normal bowel sounds. Musculoskeletal: No clubbing, cyanosis or edema bilaterally. Full range of motion without deformity. Skin: Skin color, texture, turgor normal.  No rashes or lesions. Neurologic:  Neurovascularly intact without any focal sensory/motor deficits. Cranial nerves: II-XII intact, grossly non-focal.  Psychiatric: Alert and oriented, thought content appropriate, normal insight  Capillary Refill: Brisk,< 3 seconds   Peripheral Pulses: +2 palpable, equal bilaterally       Labs:   Recent Labs     04/02/21  0557 04/03/21  0431   WBC 5.1 7.3   HGB 15.1 15.6   HCT 42.5 45.1    242     Recent Labs     04/02/21  0557 04/02/21  1424 04/03/21  0431    135* 136   K 3.3* 4.7 4.6    100 98*   CO2 23 24 27   BUN 15 14 13   CREATININE 1.0 0.8* 0.9   CALCIUM 9.2 9.0 9.1   PHOS  --  2.8  --      Recent Labs     04/02/21  0557   AST 49*   ALT 65*   BILITOT 0.6   ALKPHOS 46     No results for input(s): INR in the last 72 hours. Recent Labs     04/02/21  0855 04/02/21  1424 04/03/21  0431   TROPONINI 3.01* 7.46* 5.39*       Urinalysis:    No results found for: NITRU, WBCUA, BACTERIA, RBCUA, BLOODU, SPECGRAV, GLUCOSEU    Radiology:  XR CHEST PORTABLE   Final Result   Low lung volumes with no acute process identified.                  Assessment/Plan:    Active Hospital Problems    Diagnosis    [facility] [I21.3]    Coronary artery disease [I25.10]    Ischemic cardiomyopathy [I25.5]    ST elevation myocardial infarction involving left anterior descending (LAD) coronary artery (HCC) [I21.02]    [facility]end[facility] [C7A.8]     PLAN      ST elevation myocardial

## 2021-04-04 PROCEDURE — 99291 CRITICAL CARE FIRST HOUR: CPT | Performed by: INTERNAL MEDICINE

## 2021-04-04 PROCEDURE — 6370000000 HC RX 637 (ALT 250 FOR IP): Performed by: INTERNAL MEDICINE

## 2021-04-04 PROCEDURE — 2580000003 HC RX 258: Performed by: INTERNAL MEDICINE

## 2021-04-04 PROCEDURE — 6360000002 HC RX W HCPCS: Performed by: INTERNAL MEDICINE

## 2021-04-04 PROCEDURE — 2000000000 HC ICU R&B

## 2021-04-04 RX ADMIN — SODIUM CHLORIDE, PRESERVATIVE FREE 10 ML: 5 INJECTION INTRAVENOUS at 08:58

## 2021-04-04 RX ADMIN — ENOXAPARIN SODIUM 40 MG: 40 INJECTION SUBCUTANEOUS at 08:58

## 2021-04-04 RX ADMIN — METOPROLOL TARTRATE 25 MG: 25 TABLET, FILM COATED ORAL at 20:41

## 2021-04-04 RX ADMIN — MUPIROCIN: 20 OINTMENT TOPICAL at 20:41

## 2021-04-04 RX ADMIN — LISINOPRIL 5 MG: 5 TABLET ORAL at 08:58

## 2021-04-04 RX ADMIN — ASPIRIN 81 MG: 81 TABLET, CHEWABLE ORAL at 08:57

## 2021-04-04 RX ADMIN — TICAGRELOR 90 MG: 90 TABLET ORAL at 08:58

## 2021-04-04 RX ADMIN — TICAGRELOR 90 MG: 90 TABLET ORAL at 20:41

## 2021-04-04 RX ADMIN — MUPIROCIN: 20 OINTMENT TOPICAL at 08:58

## 2021-04-04 RX ADMIN — SODIUM CHLORIDE, PRESERVATIVE FREE 10 ML: 5 INJECTION INTRAVENOUS at 20:41

## 2021-04-04 RX ADMIN — METOPROLOL TARTRATE 25 MG: 25 TABLET, FILM COATED ORAL at 08:58

## 2021-04-04 RX ADMIN — ROSUVASTATIN CALCIUM 20 MG: 10 TABLET, FILM COATED ORAL at 20:41

## 2021-04-04 ASSESSMENT — PAIN SCALES - GENERAL: PAINLEVEL_OUTOF10: 0

## 2021-04-04 NOTE — PROGRESS NOTES
reactive to light. Conjunctivae/corneas clear. Neck: Supple, with full range of motion. No jugular venous distention. Trachea midline. Respiratory:  Normal respiratory effort. Clear to auscultation, bilaterally without Rales/Wheezes/Rhonchi. Cardiovascular: Regular rate and rhythm with normal S1/S2 without murmurs, rubs or gallops. Abdomen: Soft, non-tender, non-distended with normal bowel sounds. Musculoskeletal: No clubbing, cyanosis or edema bilaterally. Full range of motion without deformity. Skin: Skin color, texture, turgor normal.  No rashes or lesions. Neurologic:  Neurovascularly intact without any focal sensory/motor deficits. Cranial nerves: II-XII intact, grossly non-focal.  Psychiatric: Alert and oriented, thought content appropriate, normal insight  Capillary Refill: Brisk,< 3 seconds   Peripheral Pulses: +2 palpable, equal bilaterally     I examined the patient today (04/04/21). Physical exam is similar to yesterday (4/3)    Labs:   Recent Labs     04/02/21  0557 04/03/21  0431   WBC 5.1 7.3   HGB 15.1 15.6   HCT 42.5 45.1    242     Recent Labs     04/02/21  0557 04/02/21  1424 04/03/21  0431    135* 136   K 3.3* 4.7 4.6    100 98*   CO2 23 24 27   BUN 15 14 13   CREATININE 1.0 0.8* 0.9   CALCIUM 9.2 9.0 9.1   PHOS  --  2.8  --      Recent Labs     04/02/21  0557   AST 49*   ALT 65*   BILITOT 0.6   ALKPHOS 46     No results for input(s): INR in the last 72 hours. Recent Labs     04/02/21  0855 04/02/21  1424 04/03/21  0431   TROPONINI 3.01* 7.46* 5.39*       Urinalysis:    No results found for: NITRU, WBCUA, BACTERIA, RBCUA, BLOODU, SPECGRAV, GLUCOSEU    Radiology:  XR CHEST PORTABLE   Final Result   Low lung volumes with no acute process identified.                  Assessment/Plan:    Active Hospital Problems    Diagnosis    STEMI (ST elevation myocardial infarction) ([facility] [I21.3]    Coronary artery disease [I25.10]    Ischemic cardiomyopathy [I25.5]    ST elevation myocardial infarction involving left anterior descending (LAD) coronary artery ([facility]) [I21.02]    Neuroendocrine cancer ([facility] [address] [C7A.8]     PLAN      ST elevation myocardial infarction  Had cardiac cath and PCI with stent placement. Tolerated well. Currently pain-free. Medications being adjusted. Postprocedure care per cardiology. Arrhythmia noted last night    Ischemic cardiomyopathy  Noted low ejection fraction when patient had cardiac cath. Post revascularization echo shows left ventricular ejection fraction of 30 -35%    Arrhythmias  Combination of sinus ischemia and electrolyte abnormalities. No immediate postprocedure arrhythmias, but had some arrhythmia last night. Kept in the ICU    Neuroendocrine cancer  On study medication. Appears stable. Continue same. Discussed with patient and wife. Questions answered    DVT Prophylaxis: Lovenox  Diet: DIET GENERAL; Low Sodium (2 GM); Daily Fluid Restriction: 2000 ml  Code Status: Full Code    PT/OT Eval Status: Not needed.   Patient is independent and ambulatory    Dispo -inpatient stay until cleared by cardiology to leave the hospital.  So far remains in the ICU      Toña Hoffmann MD

## 2021-04-04 NOTE — PLAN OF CARE
Problem: Discharge Planning:  Goal: Discharged to appropriate level of care  Description: Discharged to appropriate level of care  Outcome: Ongoing     Problem: Cardiac Output - Decreased:  Goal: Cardiac output within specified parameters  Description: Cardiac output within specified parameters  Outcome: Ongoing     Problem: Tissue Perfusion - Cardiopulmonary, Altered:  Goal: Circulatory function within specified parameters  Description: Circulatory function within specified parameters  Outcome: Ongoing     Problem: Tobacco Use:  Goal: Will participate in inpatient tobacco-use cessation counseling  Description: Will participate in inpatient tobacco-use cessation counseling  Outcome: Completed

## 2021-04-04 NOTE — PROGRESS NOTES
Assessment: Normal respiratory effort  · Resp Auscultation: Normal to ascultation   Cardiovascular:  · Auscultation: regular rhythm and normal rate, normal S1S2, no murmur, rub or gallop  · Palpation:  Nl PMI  · JVP:  normal  · Extremities: no edema  · Cath site incision bandage and post-procedure stable. Normal pulse. No erythema or drainage  Abdomen:  · Soft, non-tender  · Normal bowel sounds  Extremities:  ·  No Cyanosis or Clubbing  Neurological/Psychiatric:  · Oriented to time, place, and person  · Non-anxious   Skin Warm and dry    Rhythm - NSR and no arrhthymias noted      Assessment: 1. Ischemic heart disease   ~post cath for anterior ST segment elevation myocardial infarction  2. Ischemic cardiomyopathy  3. Torsades    Plan:  1. The patient should be treated with the following   ~asa 81mg daily   ~beta-blockers to target HR 60-80 and -130   ~high intensity statin therapy with atorvastatin or rosuvastatin   ~anti-anginal therapy as necessary  2. DAPT as clinically indicated  3. The patient was seen for >25 minutes. >50% of the time was devoted to giving the patient detailed instructions instructions on addressing diet, regular exercise, weight control, smoking abstention, medication compliance, and stress minimization. The patient was provided written and verbal instructions regarding risk factor modification. 4.  Echocardiogram reviewed and shows anterior apical hypokinesis and EF of 30 to 35%. 5.  Correct any electrolyte issues with magnesium and potassium to goal  6. We will have EP team see in the morning and consideration for LifeVest.    Due to the high probability of clinically significant life threating deterioration of the patient's condition that required my urgent intervention, a total critical care time 40 minutes was used. This time excludes any time that may have been spent performing procedures.  This includes but not limited to vital sign monitoring, telemetry monitoring, continuous pulse oximety, IV medication, clinical response to the IV medications, documentation time , consultation time, interpretation of lab data, review of nursing notes and old record review. All questions and concerns were addressed to the patient/family. Alternatives to my treatment were discussed. The note was completed using EMR. Every effort was made to ensure accuracy; however, inadvertent computerized transcription errors may be present.     Linda Perez MD, FADUMO, [facility], [address] [place] [address]  4/4/2021 12:03 PM

## 2021-04-05 ENCOUNTER — TELEPHONE (OUTPATIENT)
Dept: CARDIOLOGY CLINIC | Age: 51
End: 2021-04-05

## 2021-04-05 VITALS
SYSTOLIC BLOOD PRESSURE: 96 MMHG | WEIGHT: 212 LBS | HEIGHT: 71 IN | BODY MASS INDEX: 29.68 KG/M2 | HEART RATE: 87 BPM | TEMPERATURE: 98.1 F | RESPIRATION RATE: 14 BRPM | OXYGEN SATURATION: 100 % | DIASTOLIC BLOOD PRESSURE: 73 MMHG

## 2021-04-05 PROBLEM — I47.20 VT (VENTRICULAR TACHYCARDIA): Status: ACTIVE | Noted: 2021-04-05

## 2021-04-05 LAB
A/G RATIO: 1.8 (ref 1.1–2.2)
ALBUMIN SERPL-MCNC: 3.9 G/DL (ref 3.4–5)
ALP BLD-CCNC: 53 U/L (ref 40–129)
ALT SERPL-CCNC: 78 U/L (ref 10–40)
ANION GAP SERPL CALCULATED.3IONS-SCNC: 7 MMOL/L (ref 3–16)
AST SERPL-CCNC: 214 U/L (ref 15–37)
BILIRUB SERPL-MCNC: 0.8 MG/DL (ref 0–1)
BUN BLDV-MCNC: 14 MG/DL (ref 7–20)
CALCIUM SERPL-MCNC: 8.4 MG/DL (ref 8.3–10.6)
CHLORIDE BLD-SCNC: 98 MMOL/L (ref 99–110)
CO2: 27 MMOL/L (ref 21–32)
CREAT SERPL-MCNC: 0.9 MG/DL (ref 0.9–1.3)
GFR AFRICAN AMERICAN: >60
GFR NON-AFRICAN AMERICAN: >60
GLOBULIN: 2.2 G/DL
GLUCOSE BLD-MCNC: 122 MG/DL (ref 70–99)
HCT VFR BLD CALC: 39.4 % (ref 40.5–52.5)
HEMOGLOBIN: 13.9 G/DL (ref 13.5–17.5)
MCH RBC QN AUTO: 35.1 PG (ref 26–34)
MCHC RBC AUTO-ENTMCNC: 35.2 G/DL (ref 31–36)
MCV RBC AUTO: 99.5 FL (ref 80–100)
PDW BLD-RTO: 14.5 % (ref 12.4–15.4)
PLATELET # BLD: 168 K/UL (ref 135–450)
PMV BLD AUTO: 7.1 FL (ref 5–10.5)
POTASSIUM SERPL-SCNC: 4.3 MMOL/L (ref 3.5–5.1)
RBC # BLD: 3.96 M/UL (ref 4.2–5.9)
SODIUM BLD-SCNC: 132 MMOL/L (ref 136–145)
TOTAL PROTEIN: 6.1 G/DL (ref 6.4–8.2)
WBC # BLD: 4.9 K/UL (ref 4–11)

## 2021-04-05 PROCEDURE — 80053 COMPREHEN METABOLIC PANEL: CPT

## 2021-04-05 PROCEDURE — 93005 ELECTROCARDIOGRAM TRACING: CPT | Performed by: INTERNAL MEDICINE

## 2021-04-05 PROCEDURE — 6360000002 HC RX W HCPCS: Performed by: INTERNAL MEDICINE

## 2021-04-05 PROCEDURE — 6370000000 HC RX 637 (ALT 250 FOR IP): Performed by: INTERNAL MEDICINE

## 2021-04-05 PROCEDURE — 36415 COLL VENOUS BLD VENIPUNCTURE: CPT

## 2021-04-05 PROCEDURE — 2000000000 HC ICU R&B

## 2021-04-05 PROCEDURE — 99255 IP/OBS CONSLTJ NEW/EST HI 80: CPT | Performed by: INTERNAL MEDICINE

## 2021-04-05 PROCEDURE — 99239 HOSP IP/OBS DSCHRG MGMT >30: CPT | Performed by: INTERNAL MEDICINE

## 2021-04-05 PROCEDURE — 2580000003 HC RX 258: Performed by: INTERNAL MEDICINE

## 2021-04-05 PROCEDURE — 85027 COMPLETE CBC AUTOMATED: CPT

## 2021-04-05 RX ORDER — LISINOPRIL 5 MG/1
5 TABLET ORAL DAILY
Qty: 30 TABLET | Refills: 3 | Status: SHIPPED | OUTPATIENT
Start: 2021-04-06 | End: 2021-04-14 | Stop reason: SDUPTHER

## 2021-04-05 RX ORDER — ASPIRIN 81 MG/1
81 TABLET, CHEWABLE ORAL DAILY
Qty: 30 TABLET | Refills: 3 | Status: SHIPPED | OUTPATIENT
Start: 2021-04-06 | End: 2021-04-14 | Stop reason: SDUPTHER

## 2021-04-05 RX ORDER — ROSUVASTATIN CALCIUM 20 MG/1
20 TABLET, COATED ORAL NIGHTLY
Qty: 30 TABLET | Refills: 3 | Status: SHIPPED | OUTPATIENT
Start: 2021-04-05 | End: 2021-04-14 | Stop reason: SDUPTHER

## 2021-04-05 RX ORDER — NITROGLYCERIN 0.4 MG/1
0.4 TABLET SUBLINGUAL EVERY 5 MIN PRN
Qty: 25 TABLET | Refills: 3 | Status: SHIPPED | OUTPATIENT
Start: 2021-04-05

## 2021-04-05 RX ADMIN — TICAGRELOR 90 MG: 90 TABLET ORAL at 09:30

## 2021-04-05 RX ADMIN — SODIUM CHLORIDE, PRESERVATIVE FREE 10 ML: 5 INJECTION INTRAVENOUS at 09:30

## 2021-04-05 RX ADMIN — METOPROLOL TARTRATE 25 MG: 25 TABLET, FILM COATED ORAL at 09:30

## 2021-04-05 RX ADMIN — ENOXAPARIN SODIUM 40 MG: 40 INJECTION SUBCUTANEOUS at 09:30

## 2021-04-05 RX ADMIN — LISINOPRIL 5 MG: 5 TABLET ORAL at 09:30

## 2021-04-05 RX ADMIN — ASPIRIN 81 MG: 81 TABLET, CHEWABLE ORAL at 09:30

## 2021-04-05 RX ADMIN — MUPIROCIN: 20 OINTMENT TOPICAL at 09:29

## 2021-04-05 ASSESSMENT — PAIN SCALES - GENERAL
PAINLEVEL_OUTOF10: 0
PAINLEVEL_OUTOF10: 0

## 2021-04-05 NOTE — DISCHARGE SUMMARY
Physician Discharge Summary       Patient ID:  Salud Solorio  [id number]  8 y.o.  1970    Admit date: 4/2/2021    Discharge date:  4/5/2021    Admitting Physician: Jocelyn Leung MD     Discharge Physician: Robert Chen MD, FADUMO, [facility]    Admission Diagnoses: STEMI (ST elevation myocardial infarction) [facility]) [I21.3]    Discharge Diagnoses: SAME    Admission Condition: fair    Discharged Condition: good    Hospital Course:     26-year-old male admitted after acute anterior ST segment elevation myocardial infarction complicated by cardiac arrest.  Patient underwent successful primary reperfusion. The patient had persistent left ventricular dysfunction on echocardiography. Patient was kept in the hospital for further evaluation of nonsustained ventricular tachycardia. He was seen by electrophysiology service and recommended for LifeVest placement.     Discharge Exam:  BP 96/73   Pulse 74   Temp 98.1 °F (36.7 °C) (Oral)   Resp 14   Ht 5' 11\" (1.803 m)   Wt 212 lb (96.2 kg)   SpO2 100%   BMI 29.57 kg/m²     General Appearance:    Alert, cooperative, no distress, appears stated age   Head:    Normocephalic, without obvious abnormality, atraumatic   Eyes:    PERRL, conjunctiva/corneas clear, EOM's intact       Ears:    Normal external ear canals, both ears   Nose:   Nares normal, septum midline, no drainage       Throat:   Lips, mucosa, and tongue normal;    Neck:   Supple, symmetrical, trachea midline, no adenopathy;        thyroid:  No enlargement/tenderness/nodules; no carotid    bruit or JVD   Back:     Symmetric, no curvature, ROM normal, no CVA tenderness   Lungs:     Clear to auscultation bilaterally, respirations unlabored   Chest wall:    No tenderness or deformity   Heart:    Regular rate and rhythm, S1 and S2 normal, no murmur, rub   or gallop   Abdomen:     Soft, non-tender, bowel sounds active all four quadrants,     no masses, no organomegaly   Genitalia:    deferred   Rectal:    deferred Extremities:   Extremities normal, atraumatic, no cyanosis or edema   Pulses:   2+ and symmetric all extremities   Skin:   Skin color, texture, turgor normal, no rashes or lesions       Neurologic:   CNII-XII intact. Normal strength, sensation and reflexes       throughout      Cath site:   No pain or discomfort on palpation. Pulse is normal.             There is no evidence for a hematoma or vascular              injury. Disposition: home    Patient Instructions:      Medication List      START taking these medications    aspirin 81 MG chewable tablet  Take 1 tablet by mouth daily  Start taking on: April 6, 2021     lisinopril 5 MG tablet  Commonly known as: PRINIVIL;ZESTRIL  Take 1 tablet by mouth daily  Start taking on: April 6, 2021     metoprolol tartrate 25 MG tablet  Commonly known as: LOPRESSOR  Take 1 tablet by mouth 2 times daily     nitroGLYCERIN 0.4 MG SL tablet  Commonly known as: Nitrostat  Place 1 tablet under the tongue every 5 minutes as needed for Chest pain     rosuvastatin 20 MG tablet  Commonly known as: CRESTOR  Take 1 tablet by mouth nightly     ticagrelor 90 MG Tabs tablet  Commonly known as: BRILINTA  Take 1 tablet by mouth 2 times daily        CONTINUE taking these medications    multivitamin capsule        STOP taking these medications    fish oil 1000 MG Caps           Where to Get Your Medications      These medications were sent to Tanya Melgar , [facility] [phone] - F [phone]  [address] [place] [address]    Phone: [phone]   · aspirin 81 MG chewable tablet  · lisinopril 5 MG tablet  · metoprolol tartrate 25 MG tablet  · nitroGLYCERIN 0.4 MG SL tablet  · rosuvastatin 20 MG tablet  · ticagrelor 90 MG Tabs tablet         Activity: no lifting, Driving, or Strenuous exercise for 2 weeks  Diet: cardiac diet  Wound Care: keep wound clean and dry.  Please call the office of physician on call if problems with access site.    Follow-up with 1-2 weeks with Maxi Caldwell and PCP. Signed:  Jodi Benavides  4/5/2021  3:00 PM  [phone] [facility]  [phone] Main Office  [phone] [place] office    Total time spent in coordinating discharge for the patient was > 30 minutes. This included but not limited to speaking with patient and family, completing discharge medication reconciliation, providing instructions and discharge planning.

## 2021-04-05 NOTE — FLOWSHEET NOTE
Bedside report given by Melo Lomeli RN, Drips verified and Skin assessment completed. No significant events over last shift. Shift assessment completed and documented; see flowsheets for details and Pt not vented. Pt resting in bed, VSS, Lines checked and verified, alarms on and audible. Repositioned patient, sacral Mepilex in place, call light within reach, will continue to closely monitor. Recent Labs     04/05/21  0425   WBC 4.9   HGB 13.9   HCT 39.4*   MCV 99.5        Recent Labs     04/02/21  1424 04/02/21  1424 04/05/21  0425   *   < > 132*   K 4.7   < > 4.3      < > 98*   CO2 24   < > 27   GLUCOSE 122*   < > 122*   PHOS 2.8  --   --    MG 2.50*  --   --    BUN 14   < > 14   CREATININE 0.8*   < > 0.9   CALCIUM 9.0   < > 8.4   LABGLOM >60   < > >60   GFRAA >60   < > >60    < > = values in this interval not displayed. Alvah Kayser 4/5/2021 1:07 PM  Estimated Creatinine Clearance: 115 mL/min (based on SCr of 0.9 mg/dL). DVT Prophylaxis: enoxaparin (Lovenox) 40 mg SQ Once daily.     GI Prophylaxis: None ordered

## 2021-04-05 NOTE — PLAN OF CARE
[facility]     Electrophysiology                                     Plan of Care    Admission date:  2021    HPI/CC: Manuel Garibay is a 45year old male without a significant medial history except for neuroendocrine tumor. He is on experimental cabozantinib. On 2021 he developed chest pain and called EMS. Notes indicate he had VT and Torsades in the ER and was defibrillated x6. He was taken to the cath lab and had a PCI of the LAD with MEY. LV gram showed an EF of 20%. Echo on 4/3/2021 showed an EF of 30-35% and severe anterior hypokinesis. He has also been treated for hypotension. On 4/3/2021 he had 4 beats of NSVT. EP evaluation has been requested by Dr. Ruth López for consideration of LifeVest. Rhythm has been sinus. Vitals:  Blood pressure 95/76, pulse 84, temperature 97.4 °F (36.3 °C), temperature source Oral, resp. rate 15, height 5' 11\" (1.803 m), weight 212 lb (96.2 kg), SpO2 99 %.   Temp  Av.2 °F (36.8 °C)  Min: 97.4 °F (36.3 °C)  Max: 98.8 °F (37.1 °C)  Pulse  Av.6  Min: 72  Max: 84  BP  Min: 86/60  Max: 101/81  SpO2  Av %  Min: 96 %  Max: 100 %    24 hour I/O    Intake/Output Summary (Last 24 hours) at 2021 1208  Last data filed at 2021 1909  Gross per 24 hour   Intake 360 ml   Output --   Net 360 ml     Current Facility-Administered Medications   Medication Dose Route Frequency Provider Last Rate Last Admin    enoxaparin (LOVENOX) injection 40 mg  40 mg Subcutaneous Daily Delmar Bird MD   40 mg at 21    sodium chloride flush 0.9 % injection 10 mL  10 mL Intravenous 2 times per day Fady Benavides MD   10 mL at 21    sodium chloride flush 0.9 % injection 10 mL  10 mL Intravenous PRN Fady Benavides MD        metoprolol tartrate (LOPRESSOR) tablet 25 mg  25 mg Oral BID Sandy Robert MD   25 mg at 04/05/21 0930    lisinopril (PRINIVIL;ZESTRIL) tablet 5 mg  5 mg Oral Daily Sandy Robert MD   5 mg at 21    rosuvastatin (CRESTOR) tablet 20 mg  20 mg Oral Nightly Nahomy Ochoa MD   20 mg at 04/04/21 2041    aspirin chewable tablet 81 mg  81 mg Oral Daily Nahomy Ochoa MD   81 mg at 04/05/21 0930    ticagrelor (BRILINTA) tablet 90 mg  90 mg Oral BID Nahomy Ochoa MD   90 mg at 04/05/21 0930    mupirocin (BACTROBAN) 2 % ointment   Nasal BID Jem Macdonald MD   Given at 04/05/21 0929    ondansetron (ZOFRAN) injection 4 mg  4 mg Intravenous Q6H PRN Nahomy Ochoa MD   4 mg at 04/02/21 1435       Objective:     Telemetry monitor: sinus    Data    Echo 4/3/2021:  The left ventricular systolic function is moderately reduced with an ejection fraction of 30 - 35 %. There is severe hypokinesis of the anterior and apical walls consistent with infarction within the territory of the left anterior descending artery. Normal left ventricle size and wall thickness. Grade I diastolic dysfunction with normal filling pressure. [facility] 4/2/2021 ([place]):  Left Heart Cath:    Findings   LVEDP 25   LVEF 20%    LV wall motion Moderate hypokinesis and severe hypokinesis of apical anterior, apex, apical inferior   Gradient across AV None   Mitral regurg No signficant      Coronary Angiogram:  Artery Findings/Result   LM Ostial 30%   LAD Ostial calcium, 80% prox and 90% prox/mid and KAUR-1 flow   LCx Luminals  OM1- very large vessel, luminals         RCA Dominant, high downward takeoff, PDA kink gnosis      Results of intervention      Lesion 1: prox LAD  Stent: Resolute Handy 3.5 x 38  Pre:   90% stenosis, KAUR 1 flow  Post: 0% stenosis, KAUR 3 flow  Suspect Diag 1 jailed and lost     Assessment/Plan  1. Successful percutaneous intervention to the proximal and mid LAD for plaque rupture event. Doing 1 drug-eluting stent. This was further optimized by OCT imaging. 2.  Note there is a hazy spot in the ostial LAD that under OCT imaging was proven to be fibroatheromatous disease.   There is no critical stenosis, no dissection no thrombus. 3.  Integrilin x6 hours. 4.  Aspirin 81 mg p.o. daily, ticagrelor 90 mg p.o. twice daily for 1 year minimum  5. We will start beta-blocker, high-dose statin, cardiac rehab phase 1 and 2, echocardiogram  6. Of note patient is on experimental chemotherapy we will therefore notify the study coordinators.                -Note this study drug supposedly affects QTC prolongation. I did not appreciate any QT prolongation on initial EKG but potassium was low at 3.3 will need to be repleted. All labs and testing reviewed. Lab Review     Renal Profile:   Lab Results   Component Value Date    CREATININE 0.9 04/05/2021    BUN 14 04/05/2021     04/05/2021    K 4.3 04/05/2021    K 3.3 04/02/2021    CL 98 04/05/2021    CO2 27 04/05/2021     CBC:    Lab Results   Component Value Date    WBC 4.9 04/05/2021    RBC 3.96 04/05/2021    RBC 5.31 04/22/2016    HGB 13.9 04/05/2021    HCT 39.4 04/05/2021    MCV 99.5 04/05/2021    RDW 14.5 04/05/2021     04/05/2021     BNP:  No results found for: BNP  Fasting Lipid Panel:    Lab Results   Component Value Date    CHOL 224 04/03/2021    HDL 38 04/03/2021    TRIG 170 04/03/2021     Cardiac Enzymes:  CK/MbTroponin  Lab Results   Component Value Date    TROPONINI 5.39 04/03/2021     PT/ INR   Lab Results   Component Value Date    INR 0.98 05/10/2016    PROTIME 11.2 05/10/2016     PTT No results found for: PTT   Lab Results   Component Value Date    MG 2.50 04/02/2021    No results found for: TSH    Assessment:  Ischemic cardiomyopathy   --35% 4/3/2021  NSVT: 4 beats noted 4/3/2021  Polymorphic VT: noted on admission prior to PCI   Anterior STEMI   CAD   -s/p PCI to LAD with MEY 4/2/2021  Hypotension: improving   HLD: uncontrolled   Neuroendocrine tumor: on study drug    Plan:   1. Continue ASA, Brilinta, stain, and lisinopril  2.  Continue Lopressor and switch to Toprol when BP stable  3. Echo for EF 90 days after guideline directed medical therapy

## 2021-04-05 NOTE — PROGRESS NOTES
Hospitalist Progress Note      PCP: Belen Reeves    Date of Admission: 4/2/2021    Chief Complaint: Chest pain    Hospital Course: The patient woke up with crushing chest pain, around 5 AM.  Called 911 and was transported to [facility]. EKG at that time showed ST elevation myocardial infarction. While he was waiting to go to Cath Lab, patient developed arrhythmia. It was torsades and ventricular fibrillation. Had cardioversion six times. Was given magnesium metoprolol and lidocaine, then lidocaine drip was started. 150 mg of amiodarone was also administered. Once the heart rhythm stabilized, patient was taken to cardiac cath. Ruptured LAD plaque was diagnosed and patient had PCI followed by a drug-eluting stent insertion. Transferred to ICU for postprocedure care. Patient developed some arrhythmias after procedure and had to be kept in the ICU. Cardiac electrophysiology consult requested. Subjective: No chest pain, no shortness of breath, no nausea or vomiting. No new issues overnight      Medications:  Reviewed    Infusion Medications   Scheduled Medications    enoxaparin  40 mg Subcutaneous Daily    sodium chloride flush  10 mL Intravenous 2 times per day    metoprolol tartrate  25 mg Oral BID    lisinopril  5 mg Oral Daily    rosuvastatin  20 mg Oral Nightly    aspirin  81 mg Oral Daily    ticagrelor  90 mg Oral BID    mupirocin   Nasal BID     PRN Meds: sodium chloride flush, ondansetron      Intake/Output Summary (Last 24 hours) at 4/5/2021 1440  Last data filed at 4/5/2021 0900  Gross per 24 hour   Intake 1037 ml   Output --   Net 1037 ml       Physical Exam Performed:    BP 96/73   Pulse 74   Temp 98.1 °F (36.7 °C) (Oral)   Resp 14   Ht 5' 11\" (1.803 m)   Wt 212 lb (96.2 kg)   SpO2 100%   BMI 29.57 kg/m²     General appearance: No apparent distress, appears stated age and cooperative. HEENT: Pupils equal, round, and reactive to light.  Conjunctivae/corneas PLAN      ST elevation myocardial infarction  Had cardiac cath and PCI with stent placement. Tolerated well. Currently pain-free. Medications being adjusted. Postprocedure care per cardiology. Arrhythmia the night before. No new arrhythmias last night. Ischemic cardiomyopathy  Noted low ejection fraction when patient had cardiac cath. Post revascularization echo shows left ventricular ejection fraction of 30 -35%  Cardiology following. Medications are being adjusted. Arrhythmias  Combination of sinus ischemia and electrolyte abnormalities. Had arrhythmias before and after procedures. Cardiac electrophysiology consult    Neuroendocrine cancer  On study medication. Appears stable. Continue same. Discussed with patient and wife. Questions answered    DVT Prophylaxis: Lovenox  Diet: DIET GENERAL; Low Sodium (2 GM); Daily Fluid Restriction: 2000 ml  Code Status: Full Code    PT/OT Eval Status: Not needed. Patient is independent and ambulatory    Dispo -inpatient stay until cleared by cardiac electrophysiology to leave the hospital.  So far remains in the ICU.   May require Marcell Cage MD

## 2021-04-05 NOTE — CONSULTS
[facility], [place] [address]                                  CONSULTATION    PATIENT NAME: Lorri Hernandez                         :        1970  MED REC NO:   [id number]                          ROOM:       [id number]  ACCOUNT NO:   [de-identified]                           ADMIT DATE: 2021  PROVIDER:     Fausto Harrington MD    CONSULT DATE:  2021    CARDIAC ELECTROPHYSIOLOGY CONSULTATION    REASON FOR CONSULTATION:  Ventricular arrhythmia. HISTORY OF PRESENT ILLNESS:  The patient is a 62-year-old man with  history of carcinoid tumor who was admitted with anterior myocardial  infarction. In the early morning hours of 2021, he developed  typical anginal-type chest pain and EMS was summoned to the scene. He  was brought to the cardiac cath lab and had multiple episodes of rapid  polymorphic ventricular tachycardia initiated with a PVC on the T-wave. Coronary angiography demonstrated occlusion of high-grade disease in the  proximal mid coronary artery. This was treated with stent placement. He has been relatively quiescent. He did have a brief episode of  nonsustained ventricular tachycardia, which was much slower and  monomorphic in appearance. The peak troponin was 7.46. Contrast  ventriculography at the time of the procedure demonstrated an ejection  fraction of 20%. ECG from 2021 at 11:00 a.m. demonstrated sinus  rhythm with QS complexes in V1 through V5 and a very small R wave in V6. Limited echo from 2021 demonstrated improvement in left  ventricular function with an ejection fraction of 30% to 35% with  anterior apical hypokinesis. Subsequent 12-lead ECG from 2021  demonstrates QS complex in V1 through V3 with some recovery of R-wave  amplitude in V4, V5 and V6. PAST MEDICAL HISTORY:  1. Hyperlipidemia.   2.  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 MEDICATIONS:  At the time of admission include multivitamins. ALLERGIES:  Include ZOCOR. SOCIAL HISTORY:  The patient does not smoke cigarettes. He does consume  alcohol about five alcoholic beverages per week. FAMILY HISTORY:  Remarkable for hyperlipidemia, and heart disease in the  mother. There is history of hyperlipidemia in the father as well. There is no family history of cardiac rhythm disturbance, syncope or  sudden cardiac death. REVIEW OF SYSTEMS:  Demonstrates no recent change in appetite or change  in weight. The patient has not had recent fever or chills. He  described chest discomfort at the time of admission. He has not had  palpitations, near syncope or syncope. Functional status remains quite  good. All other components of the 14-system review are negative. PHYSICAL EXAMINATION:  VITAL SIGNS:  Blood pressure is 96/73, heart rate is 87 beats per minute  and regular. GENERAL:  The patient is awake, alert, oriented and in no discomfort. HEENT:  Exam demonstrates normocephalic and atraumatic head. There is  no scleral icterus. Pupils are round and reactive. NECK:  Supple without thyromegaly. LUNGS:  Clear to auscultation. CARDIOVASCULAR:  Exam reveals a regular rhythm. The apical impulse is  discrete. S1 and S2 are normal.  There is no audible murmur or gallop. The jugular venous pressure is not elevated. ABDOMEN:  Mildly obese, soft and nontender. EXTREMITIES:  Demonstrate no pitting, pretibial or dependent edema. There is no cyanosis. There is no evidence for chronic venous stasis. SKIN:  Otherwise warm and dry without skin rash. DIAGNOSTIC DATA:  A 12-lead ECG from 04/05/2021 demonstrates sinus  rhythm at 77 beats per minute. There is evidence of recent anterior  myocardial infarction with evolutionary T-wave changes.   There is some  recovery of R-wave amplitude in leads V4, V5 and V6 compared to previous  ECG from

## 2021-04-05 NOTE — TELEPHONE ENCOUNTER
Patient's wife called to see if Life Vest was ordered. Attempting to contact hospital RN to find out status.

## 2021-04-05 NOTE — PROGRESS NOTES
Discharge order placed, AVS printed and waiting for representative from [employer] to come and educate patient so patient may discharge to home with wife. Prescriptions already picked up from the OP pharmacy.

## 2021-04-05 NOTE — PROGRESS NOTES
1900 - Rep from [employer] [facility] called and states the vest has been approved and would arrive in about 20 minutes. 5 - Completed Discharge education and when rep from [employer] [facility] is finished pt may leave.

## 2021-04-05 NOTE — PLAN OF CARE
Problem: Discharge Planning:  Goal: Discharged to appropriate level of care  Description: Discharged to appropriate level of care  Outcome: Ongoing     Problem: Pain - Acute:  Goal: Pain level will decrease  Description: Pain level will decrease  Outcome: Ongoing     Problem: Fluid Volume - Imbalance:  Goal: Will show no signs and symptoms of excessive bleeding  Description: Will show no signs and symptoms of excessive bleeding  Outcome: Ongoing  Goal: Absence of imbalanced fluid volume signs and symptoms  Description: Absence of imbalanced fluid volume signs and symptoms  Outcome: Ongoing     Problem: Anxiety:  Goal: Level of anxiety will decrease  Description: Level of anxiety will decrease  Outcome: Ongoing     Problem: Cardiac Output - Decreased:  Goal: Cardiac output within specified parameters  Description: Cardiac output within specified parameters  Outcome: Ongoing     Problem: Serum Glucose Level - Abnormal:  Goal: Ability to maintain appropriate glucose levels will improve  Description: Ability to maintain appropriate glucose levels will improve  Outcome: Ongoing     Problem: Tissue Perfusion - Cardiopulmonary, Altered:  Goal: Circulatory function within specified parameters  Description: Circulatory function within specified parameters  Outcome: Ongoing  Goal: Absence of angina  Description: Absence of angina  Outcome: Ongoing  Goal: Hemodynamic stability will improve  Description: Hemodynamic stability will improve  Outcome: Ongoing     Problem: Tissue Perfusion - Peripheral, Altered:  Goal: Absence of hematoma at arterial access site  Description: Absence of hematoma at arterial access site  Outcome: Ongoing  Goal: Circulatory function of lower extremities is within specified parameters  Description: Circulatory function of lower extremities is within specified parameters  Outcome: Ongoing     Problem: Tissue Perfusion - Renal, Altered:  Goal: Electrolytes within specified parameters  Description: Electrolytes within specified parameters  Outcome: Ongoing  Goal: Urine creatinine clearance will be within specified parameters  Description: Urine creatinine clearance will be within specified parameters  Outcome: Ongoing  Goal: Serum creatinine will be within specified parameters  Description: Serum creatinine will be within specified parameters  Outcome: Ongoing  Goal: Ability to achieve a balanced intake and output will improve  Description: Ability to achieve a balanced intake and output will improve  Outcome: Ongoing

## 2021-04-05 NOTE — PROGRESS NOTES
Bedside report received from [facility]. Patient resting comfortably in bed. No signs of discomfort or distress. VSS. Bed is in lowest position, wheels locked, 2/2 side rails up. Bedside table and call light within reach. White board updated. Will continue to monitor patient. Hilton Onofre RN    9:00 PM  Shift assessment complete. (See findings in flowsheet). Med pass complete. (See MAR). VSS. Patient with no complaints at this time. Hilton Onofre RN    5:18 AM  Patient resting comfortably in bed. VSS. No additional needs at this time.  Hilton Onofre RN

## 2021-04-06 ENCOUNTER — TELEPHONE (OUTPATIENT)
Dept: CARDIOLOGY CLINIC | Age: 51
End: 2021-04-06

## 2021-04-06 LAB
EKG ATRIAL RATE: 77 BPM
EKG DIAGNOSIS: NORMAL
EKG P AXIS: 48 DEGREES
EKG P-R INTERVAL: 154 MS
EKG Q-T INTERVAL: 420 MS
EKG QRS DURATION: 92 MS
EKG QTC CALCULATION (BAZETT): 475 MS
EKG R AXIS: 87 DEGREES
EKG T AXIS: 122 DEGREES
EKG VENTRICULAR RATE: 77 BPM
POC ACT LR: 159 SEC
POC ACT LR: 201 SEC
POC ACT LR: 239 SEC
POC ACT LR: 243 SEC

## 2021-04-06 PROCEDURE — 93010 ELECTROCARDIOGRAM REPORT: CPT | Performed by: INTERNAL MEDICINE

## 2021-04-06 RX ORDER — FUROSEMIDE 20 MG/1
20 TABLET ORAL DAILY
Qty: 30 TABLET | Refills: 1 | Status: SHIPPED | OUTPATIENT
Start: 2021-04-06

## 2021-04-06 RX ORDER — POTASSIUM CHLORIDE 750 MG/1
10 TABLET, EXTENDED RELEASE ORAL DAILY
Qty: 30 TABLET | Refills: 1 | Status: SHIPPED | OUTPATIENT
Start: 2021-04-06

## 2021-04-06 NOTE — TELEPHONE ENCOUNTER
Kita, pt's wife, contacted office asking if it is still ok to have pt schedule his Lanreotide 120mg subcutaneous injections that he normally gets monthly for his cancer please. They use it to control carcinoid syndrome. Please advise and contact Kita at [phone].

## 2021-04-06 NOTE — TELEPHONE ENCOUNTER
It would be okay to continue this medication, I would encourage them to continue to monitor vital signs including blood pressure and heart rate as these can sometimes be affected with this medication. Thank you.

## 2021-04-07 ENCOUNTER — TELEPHONE (OUTPATIENT)
Dept: CARDIOLOGY CLINIC | Age: 51
End: 2021-04-07

## 2021-04-07 NOTE — TELEPHONE ENCOUNTER
Kita, pt's wife, has sent [facility] paperwork to be filled out. Forms have been placed in JJP's folder. If possible please fax forms once completed and then  contact Kita once everything has been done, she would also like the forms as well.

## 2021-04-09 NOTE — TELEPHONE ENCOUNTER
Spoke with patient informed him of the status of his paperwork. Verbalized understanding, patient states that he has a follow up appointment with Dr. Maicol Calle on Wednesday 04/14/2021.

## 2021-04-13 NOTE — PROGRESS NOTES
Demijamescindy [facility]   Cardiovascular Evaluation    PATIENT: Josh Atkins  DATE: 2021  MRN: [id number]  CSN: [id number]  : 1970      Primary Care Doctor: Jodi Granger  Reason for evaluation:   Follow-Up from Hospital, Coronary Artery Disease, and Cardiomyopathy      Subjective:   History of present illness on initial date of evaluation:   Josh Atkins is a 45 y.o. patient who presents for hospital follow up. He presented to the emergency room on 2021 after waking up with substernal chest pain that woke him from his sleep. He took a full dose of Aspirin prior to the squad arriving. His EKG in the squad was concerning for STEMI, upon arrival this was confirmed. In the emergency room he had one episode of torsade de pointes and was cardioverted not requiring CPR. He had a number of episodes of VT requiring further cardioversion's. He was started on Amiodarone and Lidocaine boluses and drips and prior to urgently going to the cath lab. His angiogram resulted in percutaneous coronary intervention to the proximal and mid LAD for plaque rupture. His ejection fraction was noted to be 20% at that time. His post stent echo showed an EF of 30-35%. He was discharged with a life vest.    Today he reports that he has been feeling well since his stenting and discharge from the hospital. He does have chest discomfort that he relates to the shocking and procedure its self. He states that the night before his event, he felt indigestion and took a TUMs. He is unsure if this helped as he feel asleep. He also states that the weekend prior, he did strenuous physical labor and was abnormally fatigued the days after. Kiat his wife feels that he did have color changes in face and lips the week leading up to his event. Tim Hays has been taking his medications as prescribed without any known side effects. He did have episodes of feeling that he had to catch his breath in the middle of the night.  This only occurred for the week after his stenting and has dissipated since. This could be attributed to his Brilinta. Patient currently denies any weight gain, edema, palpitations, shortness of breath, dizziness, and syncope. Patient Active Problem List   Diagnosis    [facility] cancer ([facility]    Coronary artery disease    Ischemic cardiomyopathy    ST elevation myocardial infarction involving left anterior descending (LAD) coronary artery ([facility]    STEMI (ST elevation myocardial infarction) ([facility]    VT (ventricular tachycardia) ([facility]         Past Medical History:   has a past medical history of Endocrine cancer ([facility] Hyperlipidemia, and Tumor cells. Surgical History:   has a past surgical history that includes knee surgery (2012); Upper gastrointestinal endoscopy (4/18/2016); liver biopsy (5/10/16); and Colonoscopy (5/12/16). Social History:   reports that he has never smoked. He has never used smokeless tobacco. He reports current alcohol use. He reports that he does not use drugs. Family History:  No evidence for sudden cardiac death or premature CAD    Home Medications:  Reviewed and are listed in nursing record.  and/or listed below  Current Outpatient Medications   Medication Sig Dispense Refill    lanreotide acetate (SOMATULINE) 120 MG/0.5ML SOLN depot injection Inject 120 mg into the skin once      furosemide (LASIX) 20 MG tablet Take 1 tablet by mouth daily 30 tablet 1    potassium chloride (KLOR-CON M) 10 MEQ extended release tablet Take 1 tablet by mouth daily 30 tablet 1    aspirin 81 MG chewable tablet Take 1 tablet by mouth daily 30 tablet 3    lisinopril (PRINIVIL;ZESTRIL) 5 MG tablet Take 1 tablet by mouth daily 30 tablet 3    metoprolol tartrate (LOPRESSOR) 25 MG tablet Take 1 tablet by mouth 2 times daily 60 tablet 3    rosuvastatin (CRESTOR) 20 MG tablet Take 1 tablet by mouth nightly 30 tablet 3    ticagrelor (BRILINTA) 90 MG TABS tablet Take 1 tablet by mouth 2 times daily 60 tablet 5    nitroGLYCERIN (NITROSTAT) 0.4 MG SL tablet Place 1 tablet under the tongue every 5 minutes as needed for Chest pain 25 tablet 3    Multiple Vitamin (MULTIVITAMIN) capsule Take 1 capsule by mouth daily       No current facility-administered medications for this visit. Allergies:  Zocor [simvastatin]     Review of Systems:   A 14 point review of symptoms completed. Pertinent positives identified in the HPI, all other review of symptoms negative as below.     Objective:   PHYSICAL EXAM:    Vitals:    04/14/21 0838   BP: 102/68   Pulse: 84   SpO2: 98%    Weight: 208 lb (94.3 kg)     Wt Readings from Last 3 Encounters:   04/14/21 208 lb (94.3 kg)   04/02/21 212 lb (96.2 kg)   03/14/18 203 lb 14.8 oz (92.5 kg)         General Appearance:  Alert, cooperative, no distress, appears stated age   Head:  Normocephalic, atraumatic   Eyes:  PERRL, conjunctiva/corneas clear   Nose: Nares normal, no drainage or sinus tenderness   Throat: Lips, mucosa, and tongue normal   Neck: Supple, symmetrical, trachea midline, NL thyroid no carotid bruit or JVD   Lungs:   CTAB, respirations unlabored   Chest Wall:  No tenderness or deformity   Heart:  Regular rhythm and normal rate; S1, S2 are normal;   no murmur noted; no rub or gallop   Abdomen:   Soft, non-tender, +BS x 4, no masses, no organomegaly   Extremities: Extremities normal, atraumatic, no cyanosis or edema   Pulses: 2+ and symmetric   Skin: Skin color, texture, turgor normal, no rashes or lesions   Pysch: Normal mood and affect   Neurologic: Normal gross motor and sensory exam.         LABS   CBC:      Lab Results   Component Value Date    WBC 4.9 04/05/2021    RBC 3.96 04/05/2021    RBC 5.31 04/22/2016    HGB 13.9 04/05/2021    HCT 39.4 04/05/2021    MCV 99.5 04/05/2021    RDW 14.5 04/05/2021     04/05/2021     CMP:  Lab Results   Component Value Date     04/05/2021    K 4.3 04/05/2021    K 3.3 04/02/2021    CL 98 04/05/2021    CO2 27 04/05/2021    BUN 14 04/05/2021    CREATININE 0.9 04/05/2021    GFRAA >60 04/05/2021    AGRATIO 1.8 04/05/2021    LABGLOM >60 04/05/2021    GLUCOSE 122 04/05/2021    GLUCOSE 94 04/22/2016    PROT 6.1 04/05/2021    PROT 7.9 04/22/2016    CALCIUM 8.4 04/05/2021    BILITOT 0.8 04/05/2021    ALKPHOS 53 04/05/2021     04/05/2021    ALT 78 04/05/2021     PT/INR:   No results found for: PTINR  Liver:  No components found for: CHLPL  Lab Results   Component Value Date    ALT 78 (H) 04/05/2021     (H) 04/05/2021    GGT 48 (H) 05/16/2012    ALKPHOS 53 04/05/2021    BILITOT 0.8 04/05/2021     No results found for: LABA1C  Lipids:         Lab Results   Component Value Date    TRIG 170 (H) 04/03/2021            Lab Results   Component Value Date    HDL 38 (L) 04/03/2021            Lab Results   Component Value Date    LDLCALC 152 (H) 04/03/2021            Lab Results   Component Value Date    LABVLDL 34 04/03/2021         CARDIAC DATA   EKG:    ECHO:  4/3/2021  Summary   The left ventricular systolic function is moderately reduced with an   ejection fraction of 30 - 35 %. There is severe hypokinesis of the anterior and apical walls consistent with   infarction within the territory of the left anterior descending artery. Normal left ventricle size and wall thickness. Grade I diastolic dysfunction with normal filling pressure. MUGA:    STRESS TEST:    CARDIAC CATH: 4/2/2021  Results of intervention      Lesion 1: prox LAD  Stent: Resolute Handy 3.5 x 38  Pre:   90% stenosis, KAUR 1 flow  Post: 0% stenosis, KAUR 3 flow  Suspect Diag 1 jailed and lost     Assessment/Plan  1. Successful percutaneous intervention to the proximal and mid LAD for plaque rupture event. Doing 1 drug-eluting stent. This was further optimized by OCT imaging. 2.  Note there is a hazy spot in the ostial LAD that under OCT imaging was proven to be fibroatheromatous disease. There is no critical stenosis, no dissection no thrombus.   3.  Integrilin x6 hours.  4.  Aspirin 81 mg p.o. daily, ticagrelor 90 mg p.o. twice daily for 1 year minimum  5. We will start beta-blocker, high-dose statin, cardiac rehab phase 1 and 2, echocardiogram  6. Of note patient is on experimental chemotherapy we will therefore notify the study coordinators.                -Note this study drug supposedly affects QTC prolongation. I did not appreciate any QT prolongation on initial EKG but potassium was low at 3.3 will need to be repleted. VASCULAR/OTHER IMAGING:      Assessment and Plan   Aleksander Rodriguez is a 45 y.o. male who presents today for the following problems:      1. CAD   - 4/2/2021 PCI to prox LAD (complicated by torsades/VT requiring cardioversion in ER)  2. Ischemic Cardiomyopathy. LVEF 30 to 35%. 3. NSVT:   4. HLD  5. History of carcinoid neuroendocrine cancer    MD Plan:  1. Patient is doing well following cardiac arrest and STEMI. 2.  Signs and symptoms of heart failure and concerned he may be getting on the drier side  ` -We will hold Lasix at this time but given strict instructions on how to follow for any fluid overload  3. Continue aspirin 81 daily, lisinopril, Crestor. -We will change Lopressor to Toprol for CHF. 4.  Continue Brilinta 90 mg p.o. twice daily for 1 year minimum without interruption. 5.  Further cardiac rehab phase 2  6. Limited echocardiogram in 2 months for EF, lipids in 2 months as well  7. We will ask [facility]oll for interrogation of device for any nonsustained VT  8.   I have asked him to send his CT results and oncology results to me so we can follow for any carcinoid issues that might arise that affects his heart        Patient Active Problem List   Diagnosis    [facility]endocrine [facility] ([facility]    Coronary artery disease    Ischemic cardiomyopathy    ST elevation myocardial infarction involving left anterior descending (LAD) coronary artery ([address]    STEMI (ST elevation myocardial infarction) ([address]    VT (ventricular tachycardia) ([address] Patient Plan:  1. Repeat echocardiogram in early June to re-evaluate your heart strength. If this is better, we can consider taking off the life vest. If your heart strength is not better than 35%, we will do more testing and further discuss an implantable defibrillator. 2. Change Lopressor (Metoprolol Tartrate) to Toprol 25 mg daily. (Metoprolol Succinate). 3. Check your blood pressure once daily. Goal is no lower than 90/40 with a heart rate no lower than 50.   4. The breathing issues at night could be attributed to your Brilinta. If this returns, let us know. 5. Hold Lasix for now. Check your weight every morning. If this increase by more than 3 pounds day to day, or 5 pounds in a week, take the Lasix until your weight returns to normal.   6. Follow up with Oncology regarding resuming Cabozantinib. 7. Wait at least 1 month to get your COVID vaccine. 8. Referral to cardiac rehab. 9. Check lipids in 2 months. 10. Follow up with me in 2 months. This note was scribed in the presence of Dr. Evelyn Shen MD by Anel Reardon, MANUEL. It is a pleasure to assist in the care of Michelle Grey. Please call with any questions.         Evelyn Shen MD, [address] [facility] Cardiologist  [place]  [phone] [facility]  [phone] [address] [place] [address]

## 2021-04-14 ENCOUNTER — OFFICE VISIT (OUTPATIENT)
Dept: CARDIOLOGY CLINIC | Age: 51
End: 2021-04-14
Payer: COMMERCIAL

## 2021-04-14 VITALS
BODY MASS INDEX: 29.12 KG/M2 | WEIGHT: 208 LBS | HEIGHT: 71 IN | HEART RATE: 84 BPM | SYSTOLIC BLOOD PRESSURE: 102 MMHG | DIASTOLIC BLOOD PRESSURE: 68 MMHG | OXYGEN SATURATION: 98 %

## 2021-04-14 DIAGNOSIS — E78.2 MIXED HYPERLIPIDEMIA: ICD-10-CM

## 2021-04-14 DIAGNOSIS — I25.5 ISCHEMIC CARDIOMYOPATHY: ICD-10-CM

## 2021-04-14 DIAGNOSIS — I25.10 CORONARY ARTERY DISEASE INVOLVING NATIVE CORONARY ARTERY OF NATIVE HEART WITHOUT ANGINA PECTORIS: Primary | ICD-10-CM

## 2021-04-14 DIAGNOSIS — I47.20 VT (VENTRICULAR TACHYCARDIA): ICD-10-CM

## 2021-04-14 PROCEDURE — 99214 OFFICE O/P EST MOD 30 MIN: CPT | Performed by: INTERNAL MEDICINE

## 2021-04-14 RX ORDER — LISINOPRIL 5 MG/1
5 TABLET ORAL DAILY
Qty: 30 TABLET | Refills: 5 | Status: SHIPPED | OUTPATIENT
Start: 2021-04-14

## 2021-04-14 RX ORDER — METOPROLOL SUCCINATE 25 MG/1
25 TABLET, EXTENDED RELEASE ORAL DAILY
Qty: 30 TABLET | Refills: 5 | Status: SHIPPED | OUTPATIENT
Start: 2021-04-14

## 2021-04-14 RX ORDER — LANREOTIDE ACETATE 120 MG/.5ML
120 INJECTION SUBCUTANEOUS ONCE
COMMUNITY

## 2021-04-14 RX ORDER — ASPIRIN 81 MG/1
81 TABLET, CHEWABLE ORAL DAILY
Qty: 30 TABLET | Refills: 5 | Status: SHIPPED | OUTPATIENT
Start: 2021-04-14

## 2021-04-14 RX ORDER — ROSUVASTATIN CALCIUM 20 MG/1
20 TABLET, COATED ORAL NIGHTLY
Qty: 30 TABLET | Refills: 5 | Status: SHIPPED | OUTPATIENT
Start: 2021-04-14

## 2021-04-14 NOTE — PATIENT INSTRUCTIONS
Patient Plan:  1. Repeat echocardiogram in early June to re-evaluate your heart strength. If this is better, we can consider taking off the life vest. If your heart strength is not better than 35%, we will do more testing and further discuss an implantable defibrillator. 2. Change Lopressor (Metoprolol Tartrate) to Toprol 25 mg daily. (Metoprolol Succinate). 3. Check your blood pressure once daily. Goal is no lower than 90/40 with a heart rate no lower than 50.   4. The breathing issues at night could be attributed to your Brilinta. If this returns, let us know. 5. Hold Lasix for now. Check your weight every morning. If this increase by more than 3 pounds day to day, or 5 pounds in a week, take the Lasix until your weight returns to normal.   6. Follow up with Oncology regarding resuming Cabozantinib. 7. Wait at least 1 month to get your COVID vaccine. 8. Referral to cardiac rehab. 9. Check lipids in 2 months. 10. Follow up with me in 2 months.

## 2021-04-22 ENCOUNTER — TELEPHONE (OUTPATIENT)
Dept: CARDIOLOGY CLINIC | Age: 51
End: 2021-04-22

## 2021-05-10 ENCOUNTER — TELEPHONE (OUTPATIENT)
Dept: PULMONOLOGY | Age: 51
End: 2021-05-10

## 2021-05-10 ENCOUNTER — VIRTUAL VISIT (OUTPATIENT)
Dept: PULMONOLOGY | Age: 51
End: 2021-05-10
Payer: COMMERCIAL

## 2021-05-10 DIAGNOSIS — G47.30 OBSERVED SLEEP APNEA: ICD-10-CM

## 2021-05-10 DIAGNOSIS — I25.5 ISCHEMIC CARDIOMYOPATHY: ICD-10-CM

## 2021-05-10 DIAGNOSIS — R53.83 OTHER FATIGUE: ICD-10-CM

## 2021-05-10 DIAGNOSIS — R06.83 SNORING: Primary | ICD-10-CM

## 2021-05-10 PROCEDURE — 99204 OFFICE O/P NEW MOD 45 MIN: CPT | Performed by: NURSE PRACTITIONER

## 2021-05-10 RX ORDER — ALLOPURINOL 100 MG/1
100 TABLET ORAL DAILY
COMMUNITY

## 2021-05-10 RX ORDER — NAPROXEN 500 MG/1
500 TABLET ORAL 2 TIMES DAILY WITH MEALS
COMMUNITY

## 2021-05-10 ASSESSMENT — SLEEP AND FATIGUE QUESTIONNAIRES
HOW LIKELY ARE YOU TO NOD OFF OR FALL ASLEEP WHILE SITTING INACTIVE IN A PUBLIC PLACE: 0
HOW LIKELY ARE YOU TO NOD OFF OR FALL ASLEEP WHILE WATCHING TV: 1
HOW LIKELY ARE YOU TO NOD OFF OR FALL ASLEEP WHILE SITTING QUIETLY AFTER LUNCH WITHOUT ALCOHOL: 1
HOW LIKELY ARE YOU TO NOD OFF OR FALL ASLEEP WHILE LYING DOWN TO REST IN THE AFTERNOON WHEN CIRCUMSTANCES PERMIT: 2
ESS TOTAL SCORE: 7
HOW LIKELY ARE YOU TO NOD OFF OR FALL ASLEEP WHILE SITTING AND TALKING TO SOMEONE: 0

## 2021-05-10 NOTE — TELEPHONE ENCOUNTER
limited to the following:    Your Provider(s) may not able to provide medical treatment for your particular condition and you may be required to seek alternative healthcare or emergency care services.  The electronic systems or other security protocols or safeguards used in the Service could fail, causing a breach of privacy of your medical or other information.  Given regulatory requirements in certain jurisdictions, your Provider(s) diagnosis and/or treatment options, especially pertaining to certain prescriptions, may be limited. Acceptance   1. You understand that Services will be provided via Telehealth. This process involves the use of HIPAA compliant and secure, real-time audio-visual interfacing with a qualified and appropriately trained provider located at [facility]. 2. You understand that, under no circumstances, will this session be recorded. 3. You understand that the Provider(s) at [facility] and other clinical participants will be party to the information obtained during the Telehealth session in accordance with best medical practices. 4. You understand that the information obtained during the Telehealth session will be used to help determine the most appropriate treatment options. 5. You understand that You have the right to revoke this consent at any point in time. 6. You understand that Telehealth is voluntary, and that continued treatment is not dependent upon consent. 7. You understand that, in the event of non-consent to Telehealth services and/or technical difficulties, you will obtain services as typically provided in the absence of Telehealth technology. 8. You understand that this consent will be kept in Your medical record. 9. No potential benefits from the use of Telehealth or specific results can be guaranteed. Your condition may not be cured or improved and, in some cases, may get worse.    10. There are limitations in the provision of medical care and treatment via Telehealth and the Service and you may not be able to receive diagnosis and/or treatment through the Service for every condition for which you seek diagnosis and/or treatment. 11. There are potential risks to the use of Telehealth, including but not limited to the risks described in this Telehealth Consent. 12. Your Provider(s) have discussed the use of Telehealth and the Service with you, including the benefits and risks of such and you have provided oral consent to your Provider(s) for the use of Telehealth and the Service. 15. You understand that it is your duty to provide your Provider(s) truthful, accurate and complete information, including all relevant information regarding care that you may have received or may be receiving from other healthcare providers outside of the Service. 14. You understand that each of your Provider(s) may determine in his or sole discretion that your condition is not suitable for diagnosis and/or treatment using the Service, and that you may need to seek medical care and treatment a specialist or other healthcare provider, outside of the Service. 15. You understand that you are fully responsible for payment for all services provided by Provider(s) or through use of the Service and that you may not be able to use third-party insurance. 16. You represent that (a) you have read this Telehealth Consent carefully, (b) you understand the risks and benefits of the Service and the use of Telehealth in the medical care and treatment provided to you by Provider(s) using the Service, and (c) you have the legal capacity and authority to provide this consent for yourself and/or the minor for which you are consenting under applicable federal and state laws, including laws relating to the age of [de-identified] and/or parental/guardian consent.    17. You give your informed consent to the use of Telehealth by Provider(s) using the Service under

## 2021-05-10 NOTE — PROGRESS NOTES
Patient ID: Wan Capellan is a 45 y.o. male who is being seen today for   Chief Complaint   Patient presents with    New Patient     Sleep eval ref by Viridiana Tolbert     Referring: dr. Viridiana Tolbert    HPI:   Wan Capellan is a 45 y.o. male  for televideo appointment via video and audio doxy. me virtual visit for snoring/apnea evaluation. Patient reports snoring at night for the past 5+ years. Worse in supine position. Wakes self snoring. Has witnessed apnea. No restorative sleep. No dry mouth upon awakening. Some fatigue and tiredness during the day. No history of sleep apnea. Bedtime 10-11 pm and rise time is 730 am. It takes few minutes to fall asleep. 2 nocturia. Wakes up 2 times at night. It takes few minutes to fall back a sleep. Takes Rare nap during the day. No headache in am. No car wrecks or near wrecks because of the sleepiness. No nodding off while driving. No weight gain. No significant forgetfulness or decreased concentration. Drinks 2 caffinated beverages per day. Occassional alcohol-5-6/week. No restless feelings in legs at night. No teeth grinding. No nightmares. No sleep walking. No night time panic attacks. No narcotics. No drug abuse. No history of depression. No history of anxiety. No history of atrial fibrillation. No history of DM. +history of HTN. +history of ischemic heart disease stent x1 4/2021, currently wearing lifevest. No history of stroke. ESS is 7. No smoking. No known FH for TYRONE, RLS or narcolepsy.      Sleep Medicine 5/10/2021   Sitting and reading 2   Watching TV 1   Sitting, inactive in a public place (e.g. a theatre or a meeting) 0   As a passenger in a car for an hour without a break 1   Lying down to rest in the afternoon when circumstances permit 2   Sitting and talking to someone 0   Sitting quietly after a lunch without alcohol 1   In a car, while stopped for a few minutes in traffic 0   Total score 7       Past Medical History:  Past Medical History:   Diagnosis Date    [facility]     [facility] 04/2021    Hyperlipidemia     Tumor cells 4/18/16    [place] Endocrine Tumor- in Stomach       Past Surgical History:        Procedure Laterality Date    CAROTID STENT PLACEMENT  04/02/2021    LAD    COLONOSCOPY  5/12/16    polyps    KNEE SURGERY  2012    LIVER BIOPSY  5/10/16    CT guided    UPPER GASTROINTESTINAL ENDOSCOPY  4/18/2016       Allergies:  is allergic to zocor [simvastatin]. Social History:    TOBACCO:   reports that he has never smoked. He has never used smokeless tobacco.  ETOH:   reports current alcohol use.     Family History:       Problem Relation Age of Onset    High Cholesterol Mother     Heart Disease Mother     High Cholesterol Father     Asthma Sister     Cancer Maternal Grandfather         Possible leukemia       Current Medications:    Current Outpatient Medications:     allopurinol (ZYLOPRIM) 100 MG tablet, Take 100 mg by mouth daily, Disp: , Rfl:     naproxen (NAPROSYN) 500 MG tablet, Take 500 mg by mouth 2 times daily (with meals), Disp: , Rfl:     ticagrelor (BRILINTA) 90 MG TABS tablet, Take 1 tablet by mouth 2 times daily, Disp: 60 tablet, Rfl: 5    lanreotide acetate (SOMATULINE) 120 MG/0.5ML SOLN depot injection, Inject 120 mg into the skin once, Disp: , Rfl:     metoprolol succinate (TOPROL XL) 25 MG extended release tablet, Take 1 tablet by mouth daily, Disp: 30 tablet, Rfl: 5    aspirin 81 MG chewable tablet, Take 1 tablet by mouth daily, Disp: 30 tablet, Rfl: 5    lisinopril (PRINIVIL;ZESTRIL) 5 MG tablet, Take 1 tablet by mouth daily, Disp: 30 tablet, Rfl: 5    rosuvastatin (CRESTOR) 20 MG tablet, Take 1 tablet by mouth nightly, Disp: 30 tablet, Rfl: 5    furosemide (LASIX) 20 MG tablet, Take 1 tablet by mouth daily, Disp: 30 tablet, Rfl: 1    nitroGLYCERIN (NITROSTAT) 0.4 MG SL tablet, Place 1 tablet under the tongue every 5 minutes as needed for Chest pain, Disp: 25 tablet, Rfl: 3    potassium chloride (KLOR-CON M) 10 MEQ extended release tablet, Take 1 tablet by mouth daily (Patient not taking: Reported on 5/10/2021), Disp: 30 tablet, Rfl: 1    Multiple Vitamin (MULTIVITAMIN) capsule, Take 1 capsule by mouth daily, Disp: , Rfl:       REVIEW OF SYSTEMS:  Review of Systems  Constitutional: Negative for fever  HENT: Negative for sore throat  Eyes: Negative for redness   Respiratory: Negative for dyspnea, cough  Cardiovascular: Negative for chest pain  Gastrointestinal: Negative for vomiting, diarrhea   Genitourinary: Negative for hematuria   Musculoskeletal: Negative forarthralgias   Skin: Negative for rash  Neurological: Negative for syncope  Hematological: Negative for adenopathy  Psychiatric/Behavorial: Negative for anxiety      Objective:   PHYSICAL EXAM:  There were no vitals taken for this visit. Physical Exam  Exam:  Gen: No acute distress, does not appear to be in pain. Appears well developed and nourished. HENT: Head is normocephalic and atraumatic. Normal appearing nose. External Ears normal.   Neck: No visualized mass. Trachea is midline   Eyes: EOM intact. No visible discharge. Resp:No visualized signs of difficulty breathing or respiratory distress, speaking in full sentences. Respiratory effort normal.  Neuro: Awake. Alert. Able to follow commands. No facial asymmetry. Skin: No significant lesions or discoloration noted on facial skin    Musculoskeletal: Normal range of motion of the neck. Psych: Oriented x 3. No anxiety. Normal affect. DATA:   4/3/21- echo EF 30-35%     Assessment:       · Snoring  · Observed sleep apnea   · Fatigue  · CAD-stent x1 4/2021, VT, HTN, nonischemic cardiomyopathy with a EF of 30-35%-followed by cardiology        Plan:      · PSG to evaluate forsleep related breathing disorder. · Treatment options were discussed with patient if PSG reveals TYRONE, including CPAP therapy, oral appliances and upper airway surgery.   · Sleep hygiene  · Avoid sedatives, alcohol and caffeinated used to authenticate this note.

## 2021-05-10 NOTE — PATIENT INSTRUCTIONS
Sleep Hygiene. .. Tips for better sleep. .. Avoid naps. This will ensure you are sleepy at bedtime. If you have to take a nap, sleep less than 1 hour, before 3 pm.  Sleep only when sleepy; this reduces the time you are awake in bed. Regular exercise is recommended to help you deepen your sleep, but not within 4-6 hours of your bedtime. Timing of exercise is important, aim to exercise early in the morning or early afternoon. A light snack may help you fall asleep. Warm milk and foods high in the amino acid tryptophan, such as bananas, may help you to sleep  Be sure to avoid heavy, spicy or sugary foods 4-6 hours before bedtime and avoid at snack time. Stay away from stimulants such as caffeine and nicotine for at least 4-6 hours before bed. Stimulants can interfere with your ability to fall asleep. Caffeine is found in tea, cola, coffee, cocoa and chocolate and is best avoided at bedtime. Nicotine is found in tobacco products. Avoid alcohol 4-6 hours before bedtime. Alcohol has an immediate sleep-inducing effect, after a few hours when alcohol levels fall there is a stimulant or wake-up effect and will cause fragmented sleep. Sleep rituals are important. Give your body clues it is time to slow down and sleep. Examples include; yoga, deep breathing, listen to relaxing music, a hot bath or a few minutes of reading. Have a fixed bedtime and awakening time, Even on weekends! You will feel better keeping a regular sleep cycle, even if you are retired or not working. Get into your favorite sleep position. If not asleep in 30 minutes, get up and do something boring until you feel sleepy. Remember not to expose yourself to bright lights such as TV, phone or tablet screens. Only use your bed for sleeping. Do not use your bed as an office, workroom or recreation room. Use comfortable bedding. Uncomfortable bedding can prevent good sleep. Ensure your bedroom is quiet and comfortable.  A cooler room along with enough blankets to stay warm is recommended. If your room is too noisy, try a white noise machine. If too bright, try black out shades or an eye mask. Dont take worries to bed. Leave worries about work, school etc. behind you when you go to bed. Some people find it helpful to assign a worry period in the evening or late afternoon to write down your worries and get them out of your system. The Sleep Center at [address] [facility], [address] [place] [address]                      Phone: [phone] Fax: [phone]      Please arrive at the [facility] at the time indicated. On Saturday and Sunday night a sleep tech will come down to let you in the building and escort you upstairs to the sleep center; please call from the parking lot if no one is at the door when you arrive. PLEASE DO NOT ARRIVE TO THE SLEEP CENTER ANY EARLIER THAN 8:30PM AS THERE IS NO STAFF ON DUTY AND THE DOORS WILL BE LOCKED    IMPORTANT: We ask that you please phone the [facility] Patient Pre-Services ([phone]) at least 3-5 days prior to your sleep study to pre-register. Failing to pre-register may ultimately cause your insurance to not pay for this procedure. Please be aware that [facility] is a non-smoking facility. There is no smoking allowed anywhere on [facility] property at any time. Each patient room is a private room with cable television, WiFi and a private bathroom with shower facilities. The test itself consists of electrodes and sensors attached to specific areas of your scalp, face, chest and legs. We will also monitor respiratory effort, nasal and oral airflow and oxygen levels. The test will not hurt; it is completely painless and not invasive in any way. Please bring with you:  ? Appropriate nightclothes (pajamas, sweats, etc.), slippers and robe  ?  All medications you need during your stay, including breathing medications, nebulizers and metered dose inhalers, as well as a complete list of all medications you are currently taking. ? Your own toiletries and hairdryer if you wish to shower before you leave  ? Current Photo I.D. and insurance card  ? We do not allow any pillows or bed linens from home due to health regulations  ? We recommend that you do not bring valuables with you to the Sleep Center as we cannot be responsible for any lost or damaged personal items. Please refrain from or reduce the use of caffeine and/or alcohol prior to your sleep study and avoid napping the day of your study as these will affect the accuracy of your test results. If you are ill the day of your test (cold, upper respiratory infection, flu, etc.) please call to reschedule your test as the test will not be accurate if you are ill. If you should need to cancel or reschedule your appointment, please call the Sleep Center at [phone] as soon as possible. Please also call the Sleep Center directly to let us know if you have any special needs, dietary needs or for any further questions.

## 2021-05-10 NOTE — TELEPHONE ENCOUNTER
[facility] [address] [facility]  [address] [address] [facility], [address]  [phone]             Date of visit: 11/30/17   Subjective:      History obtained from:  the patient. Alice Yin is a 61 y.o. female who presents today for urinary frequency, dysuria for several days. This is third time in 2 months she has had UTI symptoms. Was treated with unknown antibiotic first, then macrobid second. Urine culture was done at urgent care. Usually no UTIs--has not had one in years. Does have history of endometrial cancer treated only with surgery but has been doing well on yearly checks with gyn onc. Has see a little blood at times in her urine since this started    Patient Active Problem List    Diagnosis Date Noted   Clearnce Fine hematuria 11/30/2017    DDD (degenerative disc disease), cervical 07/14/2016    Cervical spinal stenosis w/ right radiculopathy 07/14/2016    Screening exams for skin cancer 03/30/2016    Endometrial cancer ([facility] s/p Total Lap Hysterectomy-BSO 9-2015 09/10/2015    Avulsion fracture of right ankle 07/10/2015    Baker's cyst 12/09/2013    Vitamin D deficiency 12/23/2012    Postmenopause, LMP 44 yo, No HRT 12/06/2012    Seasonal allergic rhinitis 12/06/2012    Pneumonia 12/06/2012    History of depression 12/06/2012    Hyperlipidemia 12/06/2012    Chronic depression 12/06/2012    H/O Recurrent Asthmatic Bronchitis 12/06/2012    OA (osteoarthritis) of left knee 12/06/2012     Current Outpatient Prescriptions   Medication Sig Dispense Refill    ciprofloxacin HCl (CIPRO) 500 mg tablet Take 1 Tab by mouth two (2) times a day for 7 days. 14 Tab 0    FLUoxetine (PROZAC) 20 mg capsule Take 1 Cap by mouth daily. Indications: Depression 90 Cap 3    buPROPion XL (WELLBUTRIN XL) 300 mg XL tablet Take 1 Tab by mouth daily. In the AM  Indications: Depression 90 Tab 3    cholecalciferol (VITAMIN D3) 1,000 unit tablet Take  by mouth daily.       cetirizine (ZYRTEC) 10 mg Pt calling was informed on need for 31-90 day scheduled for fua on 7/13/21, and was transferred to sleep center to schedule. Tried transferring pt no answer 2x with no answer I transferred pt to their VM. Will watch to make sure patient schedules. tablet Take 10 mg by mouth daily as needed for Allergies (spring and fall).  multivitamin (ONE A DAY) tablet Take 1 Tab by mouth daily.  fluticasone (FLONASE) 50 mcg/actuation nasal spray USE 2 SPRAYS IN EACH NOSTRIL AT BEDTIME (Patient taking differently: USE 2 SPRAYS IN EACH NOSTRIL AT BEDTIME AS NEEDED) 1 Bottle 11    calcium 600 mg cap Take  by mouth daily.  albuterol (PROAIR HFA) 90 mcg/actuation inhaler Take 2 Puffs by inhalation every six (6) hours as needed.        Allergies   Allergen Reactions    Sulfa (Sulfonamide Antibiotics) Other (comments)     Causes migraines    Bactrim [Sulfamethoprim Ds] Other (comments)     headaches    Lipitor [Atorvastatin] Myalgia     Aching all over    Tramadol Itching     Past Medical History:   Diagnosis Date    Arthritis     Avulsion fracture of right ankle 7/10/2015    ~6-2015[facility], boot    Baker's cyst 12/9/2013    Left knee; 12/6/13, Dr Edwards Cervical spinal stenosis w/ right radiculopathy 7/14/2016 2016 Dr Oren Dhaliwal, PT    Chronic depression 12/6/2012    DDD (degenerative disc disease), cervical 7/14/2016    Endometrial cancer ([facility] [address] s/p Total Lap Hysterectomy-BSO 9-2015 9/10/2015    Grade 1 Endometrioid Carcinoma, Gyn Dr Moise Pacheco, Dr Lorna Powell H/O Recurrent Asthmatic Bronchitis 12/6/2012    History of depression 12/6/2012    History of mammogram 08/16/2016    Kristi Homans - Benign-negative right breast stable mass-follow up in one year    Hyperlipidemia 12/6/2012    OA (osteoarthritis) of left knee 12/6/2012    Pneumonia 12/6/2012    Postmenopause, LMP 44 yo, No HRT 12/6/2012    Screening exams for skin cancer 3/30/2016    Dermatologist routine checkes    Seasonal allergic rhinitis 12/6/2012    UTI (urinary tract infection) 10/2017    Vitamin D deficiency 12/23/2012     Past Surgical History:   Procedure Laterality Date    HX BREAST BIOPSY  12/17/12    right breast, benign, calcifications; Chuy Mckeon Brandy; Fibrosis    HX COLONOSCOPY  2015, Dr Uriel Polk    Normal, follow up 10 yrs    HX KNEE REPLACEMENT Left 2016    Dr Allyson Blanco    [address]      [facility] removed from right upper back    HX TOTAL LAPAROSCOPIC HYSTERECTOMY W/ BS&O  2015, Dr Moreno Fitting    Total Lap Hysterectomy and BSO; Endometrial cancer; grade 1 endometrioid adenocarcinoma.; Dr. Dian Atwood, 23 yo     Family History   Problem Relation Age of Onset    Kidney Disease Mother      kidney transplant age 77yo ;  renal failure age 80    Diabetes Mother 36    High Cholesterol Mother     Arthritis-osteo Mother      B knee replacements in her 63's    Heart Disease Father     Diabetes Father     Dementia Father      Alzheimer's;  after fall and hip fx    Heart Surgery Father      CABG in his 63's   [facility] Allergic Rhinitis Daughter     Hypertension Brother     Hypertension Brother     Diabetes Brother      mild, controlling w/ diet    Diabetes Maternal Grandfather     Heart Attack Maternal Grandfather 62      MI      Social History   Substance Use Topics    Smoking status: Former Smoker     Quit date: 1985    Smokeless tobacco: Never Used      Comment: used to be a light smoker x 3 yrs    Alcohol use 0.0 oz/week     0 Standard drinks or equivalent per week      Comment: rare, maybe 1-2 glasses of wine a month at most      Social History     Social History Narrative        Review of Systems  Gen: denies fever    GI: denies nausea, vomiting        Objective:     Vitals:    17 1105   BP: 121/86   Pulse: 77   Resp: 18   Temp: 97.8 °F (36.6 °C)   TempSrc: Oral   SpO2: 98%   Weight: 208 lb (94.3 kg)   Height: 5' 9\" (1.753 m)     Body mass index is 30.72 kg/(m^2).      General: stated age, well developed, well nourished and in NAD  Lungs:  clear to auscultation w/o rales, rhonchi, wheezes w/normal effort and no use of accessory muscles of respiration   Heart: regular rate and rhythm, S1, S2 normal, no murmur, click, rub or gallop  Abdomen: soft, nontender, no CVA tenderness  Ext:  No edema noted. Skin:  Normal. and no rash or abnormalities   Psych: alert and oriented to person, place, time and situation and Speech: appropriate quality, quantity and organization of sentences       Assessment/Plan:       ICD-10-CM ICD-9-CM    1. Bladder infection N30.90 595.9 AMB POC URINALYSIS DIP STICK AUTO W/ MICRO       CULTURE, URINE   2. Gross hematuria R31.0 599.71         Orders Placed This Encounter    CULTURE, URINE    AMB POC URINALYSIS DIP STICK AUTO W/ MICRO     ciprofloxacin HCl (CIPRO) 500 mg tablet       Will reculture and also try to get record of first culture  Try longer course and different antibiotic for now  Reviewed worrisome signs or symptoms for which to call. Will need to verify hematuria gone in 1-2 months, if not will refer to urology. Discussed the diagnosis and plan and she expressed understanding. Follow-up Disposition:  Return if symptoms worsen or fail to improve and in 1-2 mo just to recheck urine.     Alton Cantu MD

## 2021-05-12 ENCOUNTER — HOSPITAL ENCOUNTER (OUTPATIENT)
Dept: CARDIAC REHAB | Age: 51
Setting detail: THERAPIES SERIES
Discharge: HOME | End: 2021-05-12
Payer: OTHER GOVERNMENT

## 2021-05-26 ENCOUNTER — HOSPITAL ENCOUNTER (OUTPATIENT)
Dept: CARDIAC REHAB | Age: 51
Setting detail: THERAPIES SERIES
Discharge: HOME | End: 2021-05-26
Payer: OTHER GOVERNMENT

## 2021-05-26 PROCEDURE — 93798 PHYS/QHP OP CAR RHAB W/ECG: CPT

## 2021-05-27 ENCOUNTER — TELEPHONE (OUTPATIENT)
Dept: PULMONOLOGY | Age: 51
End: 2021-05-27

## 2021-05-27 NOTE — TELEPHONE ENCOUNTER
Need Auth from PCP  Received: Today  NINI Lange, APRN - CNP  Cc: [facility] Support; Marty Ross;  Jessie Chaney  Caller: Unspecified (Today, 12:06 PM)  Please have the patient's PCP obtain an authorization for the in lab PSG scheduled for 06/23/2021, and scan into patient's chart     Thank you

## 2021-05-28 ENCOUNTER — HOSPITAL ENCOUNTER (OUTPATIENT)
Dept: CARDIAC REHAB | Age: 51
Setting detail: THERAPIES SERIES
Discharge: HOME | End: 2021-05-28
Payer: OTHER GOVERNMENT

## 2021-05-28 PROCEDURE — 93798 PHYS/QHP OP CAR RHAB W/ECG: CPT

## 2021-06-02 ENCOUNTER — HOSPITAL ENCOUNTER (OUTPATIENT)
Dept: CARDIAC REHAB | Age: 51
Setting detail: THERAPIES SERIES
Discharge: HOME | End: 2021-06-02
Payer: OTHER GOVERNMENT

## 2021-06-02 PROCEDURE — 93798 PHYS/QHP OP CAR RHAB W/ECG: CPT

## 2021-06-04 ENCOUNTER — HOSPITAL ENCOUNTER (OUTPATIENT)
Dept: CARDIAC REHAB | Age: 51
Setting detail: THERAPIES SERIES
Discharge: HOME | End: 2021-06-04
Payer: OTHER GOVERNMENT

## 2021-06-04 PROCEDURE — 93798 PHYS/QHP OP CAR RHAB W/ECG: CPT

## 2021-06-04 NOTE — PROGRESS NOTES
[facility] [address] [facility]   Cardiovascular Evaluation    PATIENT: Bethany Rees  DATE: 2021  MRN: [id number]  CSN: [id number]  : 1970      Primary Care Doctor: Angel Quiñones  Reason for evaluation:   Follow-up      Subjective:   History of present illness on initial date of evaluation:   Bethany Rees is a 45 y.o. patient who presents for hospital follow up. He presented to the emergency room on 2021 after waking up with substernal chest pain that woke him from his sleep. He took a full dose of Aspirin prior to the squad arriving. His EKG in the squad was concerning for STEMI, upon arrival this was confirmed. In the emergency room he had one episode of torsade de pointes and was cardioverted not requiring CPR. He had a number of episodes of VT requiring further cardioversion's. He was started on Amiodarone and Lidocaine boluses and drips and prior to urgently going to the cath lab. His angiogram resulted in percutaneous coronary intervention to the proximal and mid LAD for plaque rupture. His ejection fraction was noted to be 20% at that time. His post stent echo showed an EF of 30-35%. He was discharged with a life vest.    Last OV 21 he reported that he has been feeling well since his stenting and discharge from the hospital. He does have chest discomfort that he relates to the shocking and procedure its self. He states that the night before his event, he felt indigestion and took a TUMs. He is unsure if this helped as he feel asleep. He also states that the weekend prior, he did strenuous physical labor and was abnormally fatigued the days after. Kita his wife feels that he did have color changes in face and lips the week leading up to his event. Harrison Carnes has been taking his medications as prescribed without any known side effects. He did have episodes of feeling that he had to catch his breath in the middle of the night.  This only occurred for the week after his stenting and has dissipated since. This could be attributed to his Brilinta. Patient denied any weight gain, edema, palpitations, shortness of breath, dizziness, and syncope. Today he reports feeling ok. Preliminary echo reading today shows EF 40-45%. New finding of Aortic Aneurysm at 3.9 cm. He denies chest pain, sob, palpitations, dizziness or syncope. He is taking his medications as prescribed. Patient Active Problem List   Diagnosis    [facility]endocrine cancer ([facility]    Coronary artery disease    Ischemic cardiomyopathy    [facility] elevation [facility] involving [facility]) [facility]    STEMI [facility]) [facility]    VT [facility]) [facility]    Aortic aneurysm without rupture ([facility]         Past Medical History:   has a past medical history of Endocrine cancer ([facility] Heart attack ([facility] Hyperlipidemia, and Tumor cells. Surgical History:   has a past surgical history that includes knee surgery (2012); Upper gastrointestinal endoscopy (4/18/2016); liver biopsy (5/10/16); Colonoscopy (5/12/16); and Carotid stent placement (04/02/2021). Social History:   reports that he has never smoked. He has never used smokeless tobacco. He reports current alcohol use. He reports that he does not use drugs. Family History:  No evidence for sudden cardiac death or premature CAD    Home Medications:  Reviewed and are listed in nursing record.  and/or listed below  Current Outpatient Medications   Medication Sig Dispense Refill    allopurinol (ZYLOPRIM) 100 MG tablet Take 100 mg by mouth daily      naproxen (NAPROSYN) 500 MG tablet Take 500 mg by mouth 2 times daily (with meals)      ticagrelor (BRILINTA) 90 MG TABS tablet Take 1 tablet by mouth 2 times daily 60 tablet 5    lanreotide acetate (SOMATULINE) 120 MG/0.5ML SOLN depot injection Inject 120 mg into the skin once      metoprolol succinate (TOPROL XL) 25 MG extended release tablet Take 1 tablet by mouth daily 30 tablet 5    aspirin 81 MG chewable tablet Take 1 tablet by mouth daily 30 tablet 5    lisinopril (PRINIVIL;ZESTRIL) 5 MG tablet Take 1 tablet by mouth daily 30 tablet 5    rosuvastatin (CRESTOR) 20 MG tablet Take 1 tablet by mouth nightly 30 tablet 5    furosemide (LASIX) 20 MG tablet Take 1 tablet by mouth daily 30 tablet 1    nitroGLYCERIN (NITROSTAT) 0.4 MG SL tablet Place 1 tablet under the tongue every 5 minutes as needed for Chest pain 25 tablet 3    potassium chloride (KLOR-CON M) 10 MEQ extended release tablet Take 1 tablet by mouth daily (Patient not taking: Reported on 5/10/2021) 30 tablet 1    Multiple Vitamin (MULTIVITAMIN) capsule Take 1 capsule by mouth daily (Patient not taking: Reported on 6/8/2021)       No current facility-administered medications for this visit. Allergies:  Zocor [simvastatin]     Review of Systems:   A 14 point review of symptoms completed. Pertinent positives identified in the HPI, all other review of symptoms negative as below.     Objective:   PHYSICAL EXAM:    Vitals:    06/08/21 0920   BP: 112/66   Pulse: 74   SpO2: 99%    Weight: 203 lb 8 oz (92.3 kg)     Wt Readings from Last 3 Encounters:   06/08/21 203 lb 8 oz (92.3 kg)   04/14/21 208 lb (94.3 kg)   04/02/21 212 lb (96.2 kg)         General Appearance:  Alert, cooperative, no distress, appears stated age   Head:  Normocephalic, atraumatic   Eyes:  PERRL, conjunctiva/corneas clear   Nose: Nares normal, no drainage or sinus tenderness   Throat: Lips, mucosa, and tongue normal   Neck: Supple, symmetrical, trachea midline, NL thyroid no carotid bruit or JVD   Lungs:   CTAB, respirations unlabored   Chest Wall:  No tenderness or deformity   Heart:  Regular rhythm and normal rate; S1, S2 are normal;   no murmur noted; no rub or gallop   Abdomen:   Soft, non-tender, +BS x 4, no masses, no organomegaly   Extremities: Extremities normal, atraumatic, no cyanosis or edema   Pulses: 2+ and symmetric   Skin: Skin color, texture, turgor normal, no rashes or lesions   Pysch: Normal mood and affect   Neurologic: Normal gross motor and sensory exam.         LABS   CBC:      Lab Results   Component Value Date    WBC 4.9 04/05/2021    RBC 3.96 04/05/2021    RBC 5.31 04/22/2016    HGB 13.9 04/05/2021    HCT 39.4 04/05/2021    MCV 99.5 04/05/2021    RDW 14.5 04/05/2021     04/05/2021     CMP:  Lab Results   Component Value Date     04/05/2021    K 4.3 04/05/2021    K 3.3 04/02/2021    CL 98 04/05/2021    CO2 27 04/05/2021    BUN 14 04/05/2021    CREATININE 0.9 04/05/2021    GFRAA >60 04/05/2021    AGRATIO 1.8 04/05/2021    LABGLOM >60 04/05/2021    GLUCOSE 122 04/05/2021    GLUCOSE 94 04/22/2016    PROT 6.1 04/05/2021    PROT 7.9 04/22/2016    CALCIUM 8.4 04/05/2021    BILITOT 0.8 04/05/2021    ALKPHOS 53 04/05/2021     04/05/2021    ALT 78 04/05/2021     PT/INR:   No results found for: PTINR  Liver:  No components found for: CHLPL  Lab Results   Component Value Date    ALT 78 (H) 04/05/2021     (H) 04/05/2021    GGT 48 (H) 05/16/2012    ALKPHOS 53 04/05/2021    BILITOT 0.8 04/05/2021     No results found for: LABA1C  Lipids:         Lab Results   Component Value Date    TRIG 170 (H) 04/03/2021            Lab Results   Component Value Date    HDL 38 (L) 04/03/2021            Lab Results   Component Value Date    LDLCALC 152 (H) 04/03/2021            Lab Results   Component Value Date    LABVLDL 34 04/03/2021         CARDIAC DATA   EKG:    ECHO:  4/3/2021  Summary   The left ventricular systolic function is moderately reduced with an   ejection fraction of 30 - 35 %. There is severe hypokinesis of the anterior and apical walls consistent with   infarction within the territory of the left anterior descending artery. Normal left ventricle size and wall thickness. Grade I diastolic dysfunction with normal filling pressure.       MUGA:    STRESS TEST:    CARDIAC CATH: 4/2/2021  Results of intervention

## 2021-06-07 ENCOUNTER — HOSPITAL ENCOUNTER (OUTPATIENT)
Dept: CARDIAC REHAB | Age: 51
Setting detail: THERAPIES SERIES
Discharge: HOME | End: 2021-06-07
Payer: OTHER GOVERNMENT

## 2021-06-07 PROCEDURE — 93798 PHYS/QHP OP CAR RHAB W/ECG: CPT

## 2021-06-08 ENCOUNTER — HOSPITAL ENCOUNTER (OUTPATIENT)
Dept: CARDIOLOGY | Age: 51
Discharge: HOME | End: 2021-06-08
Payer: COMMERCIAL

## 2021-06-08 ENCOUNTER — OFFICE VISIT (OUTPATIENT)
Dept: CARDIOLOGY CLINIC | Age: 51
End: 2021-06-08
Payer: COMMERCIAL

## 2021-06-08 VITALS
SYSTOLIC BLOOD PRESSURE: 112 MMHG | HEIGHT: 71 IN | DIASTOLIC BLOOD PRESSURE: 66 MMHG | WEIGHT: 203.5 LBS | HEART RATE: 74 BPM | OXYGEN SATURATION: 99 % | BODY MASS INDEX: 28.49 KG/M2

## 2021-06-08 DIAGNOSIS — I25.10 CORONARY ARTERY DISEASE INVOLVING NATIVE CORONARY ARTERY OF NATIVE HEART WITHOUT ANGINA PECTORIS: Primary | ICD-10-CM

## 2021-06-08 DIAGNOSIS — I47.20 VT (VENTRICULAR TACHYCARDIA): ICD-10-CM

## 2021-06-08 DIAGNOSIS — I47.29 NSVT (NONSUSTAINED VENTRICULAR TACHYCARDIA): ICD-10-CM

## 2021-06-08 DIAGNOSIS — I71.20 THORACIC AORTIC ANEURYSM WITHOUT RUPTURE: ICD-10-CM

## 2021-06-08 DIAGNOSIS — I25.5 ISCHEMIC CARDIOMYOPATHY: ICD-10-CM

## 2021-06-08 DIAGNOSIS — E55.9 VITAMIN D DEFICIENCY: ICD-10-CM

## 2021-06-08 DIAGNOSIS — I21.02 ST ELEVATION MYOCARDIAL INFARCTION INVOLVING LEFT ANTERIOR DESCENDING (LAD) CORONARY ARTERY (HCC): ICD-10-CM

## 2021-06-08 PROBLEM — I71.9 AORTIC ANEURYSM WITHOUT RUPTURE (HCC): Status: ACTIVE | Noted: 2021-06-08

## 2021-06-08 PROCEDURE — 93308 TTE F-UP OR LMTD: CPT

## 2021-06-08 PROCEDURE — 99214 OFFICE O/P EST MOD 30 MIN: CPT | Performed by: INTERNAL MEDICINE

## 2021-06-08 NOTE — LETTER
mouth daily 30 tablet 5    aspirin 81 MG chewable tablet Take 1 tablet by mouth daily 30 tablet 5    lisinopril (PRINIVIL;ZESTRIL) 5 MG tablet Take 1 tablet by mouth daily 30 tablet 5    rosuvastatin (CRESTOR) 20 MG tablet Take 1 tablet by mouth nightly 30 tablet 5    furosemide (LASIX) 20 MG tablet Take 1 tablet by mouth daily 30 tablet 1    nitroGLYCERIN (NITROSTAT) 0.4 MG SL tablet Place 1 tablet under the tongue every 5 minutes as needed for Chest pain 25 tablet 3    potassium chloride (KLOR-CON M) 10 MEQ extended release tablet Take 1 tablet by mouth daily (Patient not taking: Reported on 5/10/2021) 30 tablet 1    Multiple Vitamin (MULTIVITAMIN) capsule Take 1 capsule by mouth daily (Patient not taking: Reported on 6/8/2021)       No current facility-administered medications for this visit. Allergies:  Zocor [simvastatin]     Review of Systems:   A 14 point review of symptoms completed. Pertinent positives identified in the HPI, all other review of symptoms negative as below.     Objective:   PHYSICAL EXAM:    Vitals:    06/08/21 0920   BP: 112/66   Pulse: 74   SpO2: 99%    Weight: 203 lb 8 oz (92.3 kg)     Wt Readings from Last 3 Encounters:   06/08/21 203 lb 8 oz (92.3 kg)   04/14/21 208 lb (94.3 kg)   04/02/21 212 lb (96.2 kg)         General Appearance:  Alert, cooperative, no distress, appears stated age   Head:  Normocephalic, atraumatic   Eyes:  PERRL, conjunctiva/corneas clear   Nose: Nares normal, no drainage or sinus tenderness   Throat: Lips, mucosa, and tongue normal   Neck: Supple, symmetrical, trachea midline, NL thyroid no carotid bruit or JVD   Lungs:   CTAB, respirations unlabored   Chest Wall:  No tenderness or deformity   Heart:  Regular rhythm and normal rate; S1, S2 are normal;   no murmur noted; no rub or gallop   Abdomen:   Soft, non-tender, +BS x 4, no masses, no organomegaly   Extremities: Extremities normal, atraumatic, no cyanosis or edema   Pulses: 2+ and symmetric Skin: Skin color, texture, turgor normal, no rashes or lesions   Pysch: Normal mood and affect   Neurologic: Normal gross motor and sensory exam.         LABS   CBC:      Lab Results   Component Value Date    WBC 4.9 04/05/2021    RBC 3.96 04/05/2021    RBC 5.31 04/22/2016    HGB 13.9 04/05/2021    HCT 39.4 04/05/2021    MCV 99.5 04/05/2021    RDW 14.5 04/05/2021     04/05/2021     CMP:  Lab Results   Component Value Date     04/05/2021    K 4.3 04/05/2021    K 3.3 04/02/2021    CL 98 04/05/2021    CO2 27 04/05/2021    BUN 14 04/05/2021    CREATININE 0.9 04/05/2021    GFRAA >60 04/05/2021    AGRATIO 1.8 04/05/2021    LABGLOM >60 04/05/2021    GLUCOSE 122 04/05/2021    GLUCOSE 94 04/22/2016    PROT 6.1 04/05/2021    PROT 7.9 04/22/2016    CALCIUM 8.4 04/05/2021    BILITOT 0.8 04/05/2021    ALKPHOS 53 04/05/2021     04/05/2021    ALT 78 04/05/2021     PT/INR:   No results found for: PTINR  Liver:  No components found for: CHLPL  Lab Results   Component Value Date    ALT 78 (H) 04/05/2021     (H) 04/05/2021    GGT 48 (H) 05/16/2012    ALKPHOS 53 04/05/2021    BILITOT 0.8 04/05/2021     No results found for: LABA1C  Lipids:         Lab Results   Component Value Date    TRIG 170 (H) 04/03/2021            Lab Results   Component Value Date    HDL 38 (L) 04/03/2021            Lab Results   Component Value Date    LDLCALC 152 (H) 04/03/2021            Lab Results   Component Value Date    LABVLDL 34 04/03/2021         CARDIAC DATA   EKG:    ECHO:  4/3/2021  Summary   The left ventricular systolic function is moderately reduced with an   ejection fraction of 30 - 35 %. There is severe hypokinesis of the anterior and apical walls consistent with   infarction within the territory of the left anterior descending artery. Normal left ventricle size and wall thickness. Grade I diastolic dysfunction with normal filling pressure.       MUGA:    STRESS TEST:    CARDIAC CATH: 4/2/2021  Results of intervention      Lesion 1: prox LAD  Stent: Resolute [place] 3.5 x 38  Pre:   90% stenosis, KAUR 1 flow  Post: 0% stenosis, KAUR 3 flow  Suspect Diag 1 jailed and lost     Assessment/Plan  1. Successful percutaneous intervention to the proximal and mid LAD for plaque rupture event. Doing 1 drug-eluting stent. This was further optimized by OCT imaging. 2.  Note there is a hazy spot in the ostial LAD that under OCT imaging was proven to be fibroatheromatous disease. There is no critical stenosis, no dissection no thrombus. 3.  Integrilin x6 hours. 4.  Aspirin 81 mg p.o. daily, ticagrelor 90 mg p.o. twice daily for 1 year minimum  5. We will start beta-blocker, high-dose statin, cardiac rehab phase 1 and 2, echocardiogram  6. Of note patient is on experimental chemotherapy we will therefore notify the study coordinators.                -Note this study drug supposedly affects QTC prolongation. I did not appreciate any QT prolongation on initial EKG but potassium was low at 3.3 will need to be repleted. VASCULAR/OTHER IMAGING:      Assessment and Plan   Kris Polk is a 45 y.o. male who presents today for the following problems:      1. CAD   - 4/2/2021 PCI to prox LAD (complicated by torsades/VT requiring cardioversion in ER)  2. Ischemic Cardiomyopathy. Improving    LVEF 30 to 35%. --45%  3. NSVT: resolved  4. HLD: continued  5. History of carcinoid neuroendocrine cancer  6. Ascending aortic aneurysm: New   3.92 cm recent echo  7. Low vit D: 27    MD Plan:  1. Patient continues to feel substantially better and has no complaints today  2. Echo has shown improvement in the affected area of his heart but does not appear to be completely normal and may show some areas of thinning consistent with possible myocardial scar formation   -Repeat echo in 3 months  3. Aorta is better visualized today and does show a 3.92 cm ascending aortic aneurysm. Aortic valve is trileaflet  4.   We will update vitamin D, lipids and blood work today  5.  continue aspirin, ticagrelor, Crestor, Toprol, lisinopril   -Continue Lasix as needed for has not needed any        Patient Active Problem List   Diagnosis    [facility]    Coronary artery disease    Ischemic cardiomyopathy    ST elevation myocardial infarction involving left anterior descending (LAD) coronary artery ([facility]    STEMI (ST elevation myocardial infarction) ([facility]    [facility]    Aortic aneurysm without rupture ([facility]       Patient Plan:  1. You may discontinue the Life Vest  2. Echocardiogram to view size and strength of the heart in 3-4 months   3. No change in medications  4. Continue with cardiac rehab  5. Labs - Fasting - lipids, lft's, and Vit D   ~we will call you with the results  6. Follow up in 3-4 months with ECHO   7. Ok to travel       It is a pleasure to assist in the care of Mohan Clemons. Please call with any questions. This note was scribed in the presence of Aminta Robins MD by Eliza Apple RN.     / Virgilio Mims MD, personally performed the services described in this documentation as scribed by the above signed scribe in my presence, and it is both accurate and complete to the best of our ability and knowledge. I agree with the details independently gathered by my clinical support staff, while the remaining scribed note accurately describes my personal service to the patient. The above RN is working as a scribe for and in the presence of myself . Working as a scribe, the RN may have prepopulated components of this note with my historical intellectual property under my direct supervision. Any additions to this intellectual property were performed at my direction. Furthermore, the content and accuracy of this note have been reviewed by me to the best of my ability.        Neil Roa MD, [facility] Cardiologist  [facility]  [phone] [facility]  [phone] [place] [facility]

## 2021-06-11 ENCOUNTER — HOSPITAL ENCOUNTER (OUTPATIENT)
Dept: CARDIAC REHAB | Age: 51
Setting detail: THERAPIES SERIES
Discharge: HOME | End: 2021-06-11
Payer: OTHER GOVERNMENT

## 2021-06-11 PROCEDURE — 93798 PHYS/QHP OP CAR RHAB W/ECG: CPT

## 2021-06-14 NOTE — TELEPHONE ENCOUNTER
Patient returned call and notified PA denied because care is offered through the [place]. Patient notified will have to go to [place]. Patient would like information to be sent through private insurance.   Patient transferred to [address] [place] [address]

## 2021-06-16 ENCOUNTER — HOSPITAL ENCOUNTER (OUTPATIENT)
Dept: CARDIAC REHAB | Age: 51
Setting detail: THERAPIES SERIES
Discharge: HOME | End: 2021-06-16
Payer: OTHER GOVERNMENT

## 2021-06-16 PROCEDURE — 93798 PHYS/QHP OP CAR RHAB W/ECG: CPT

## 2021-06-18 ENCOUNTER — HOSPITAL ENCOUNTER (OUTPATIENT)
Dept: CARDIAC REHAB | Age: 51
Setting detail: THERAPIES SERIES
Discharge: HOME | End: 2021-06-18
Payer: OTHER GOVERNMENT

## 2021-06-18 PROCEDURE — 93798 PHYS/QHP OP CAR RHAB W/ECG: CPT

## 2021-06-21 ENCOUNTER — HOSPITAL ENCOUNTER (OUTPATIENT)
Dept: CARDIAC REHAB | Age: 51
Setting detail: THERAPIES SERIES
Discharge: HOME | End: 2021-06-21
Payer: OTHER GOVERNMENT

## 2021-06-21 PROCEDURE — 93798 PHYS/QHP OP CAR RHAB W/ECG: CPT

## 2021-06-22 NOTE — TELEPHONE ENCOUNTER
Called patient and left message. Pt needs to be transferred to the sleep center as they never spoke with him.

## 2021-06-23 ENCOUNTER — HOSPITAL ENCOUNTER (OUTPATIENT)
Age: 51
Discharge: HOME | End: 2021-06-23
Payer: OTHER GOVERNMENT

## 2021-06-23 ENCOUNTER — HOSPITAL ENCOUNTER (OUTPATIENT)
Dept: CARDIAC REHAB | Age: 51
Setting detail: THERAPIES SERIES
Discharge: HOME | End: 2021-06-23
Payer: OTHER GOVERNMENT

## 2021-06-23 DIAGNOSIS — I25.10 CORONARY ARTERY DISEASE INVOLVING NATIVE CORONARY ARTERY OF NATIVE HEART WITHOUT ANGINA PECTORIS: ICD-10-CM

## 2021-06-23 DIAGNOSIS — E55.9 VITAMIN D DEFICIENCY: ICD-10-CM

## 2021-06-23 DIAGNOSIS — I21.02 ST ELEVATION MYOCARDIAL INFARCTION INVOLVING LEFT ANTERIOR DESCENDING (LAD) CORONARY ARTERY (HCC): ICD-10-CM

## 2021-06-23 DIAGNOSIS — I25.5 ISCHEMIC CARDIOMYOPATHY: ICD-10-CM

## 2021-06-23 LAB
ALBUMIN SERPL-MCNC: 4.5 G/DL (ref 3.4–5)
ALP BLD-CCNC: 44 U/L (ref 40–129)
ALT SERPL-CCNC: 34 U/L (ref 10–40)
AST SERPL-CCNC: 36 U/L (ref 15–37)
BILIRUB SERPL-MCNC: 0.4 MG/DL (ref 0–1)
BILIRUBIN DIRECT: <0.2 MG/DL (ref 0–0.3)
BILIRUBIN, INDIRECT: NORMAL MG/DL (ref 0–1)
CHOLESTEROL, FASTING: 120 MG/DL (ref 0–199)
HDLC SERPL-MCNC: 46 MG/DL (ref 40–60)
LDL CHOLESTEROL CALCULATED: 53 MG/DL
TOTAL PROTEIN: 7.1 G/DL (ref 6.4–8.2)
TRIGLYCERIDE, FASTING: 107 MG/DL (ref 0–150)
VITAMIN D 25-HYDROXY: 28.6 NG/ML
VLDLC SERPL CALC-MCNC: 21 MG/DL

## 2021-06-23 PROCEDURE — 93798 PHYS/QHP OP CAR RHAB W/ECG: CPT

## 2021-06-23 PROCEDURE — 80076 HEPATIC FUNCTION PANEL: CPT

## 2021-06-23 PROCEDURE — 36415 COLL VENOUS BLD VENIPUNCTURE: CPT

## 2021-06-23 PROCEDURE — 82306 VITAMIN D 25 HYDROXY: CPT

## 2021-06-23 PROCEDURE — 80061 LIPID PANEL: CPT

## 2021-06-24 NOTE — TELEPHONE ENCOUNTER
Spoke to patient and he said that he is going to follow with the [facility]

## 2021-06-25 ENCOUNTER — HOSPITAL ENCOUNTER (OUTPATIENT)
Dept: CARDIAC REHAB | Age: 51
Setting detail: THERAPIES SERIES
Discharge: HOME | End: 2021-06-25
Payer: OTHER GOVERNMENT

## 2021-06-25 PROCEDURE — 93798 PHYS/QHP OP CAR RHAB W/ECG: CPT

## 2021-06-28 ENCOUNTER — HOSPITAL ENCOUNTER (OUTPATIENT)
Dept: CARDIAC REHAB | Age: 51
Setting detail: THERAPIES SERIES
Discharge: HOME | End: 2021-06-28
Payer: OTHER GOVERNMENT

## 2021-06-28 PROCEDURE — 93798 PHYS/QHP OP CAR RHAB W/ECG: CPT

## 2021-06-30 ENCOUNTER — APPOINTMENT (OUTPATIENT)
Dept: CARDIAC REHAB | Age: 51
End: 2021-06-30
Payer: OTHER GOVERNMENT

## 2021-07-02 ENCOUNTER — HOSPITAL ENCOUNTER (OUTPATIENT)
Dept: CARDIAC REHAB | Age: 51
Setting detail: THERAPIES SERIES
Discharge: HOME | End: 2021-07-02
Payer: OTHER GOVERNMENT

## 2021-07-02 PROCEDURE — 93798 PHYS/QHP OP CAR RHAB W/ECG: CPT

## 2021-07-07 ENCOUNTER — HOSPITAL ENCOUNTER (OUTPATIENT)
Dept: CARDIAC REHAB | Age: 51
Setting detail: THERAPIES SERIES
Discharge: HOME | End: 2021-07-07
Payer: OTHER GOVERNMENT

## 2021-07-07 PROCEDURE — 93798 PHYS/QHP OP CAR RHAB W/ECG: CPT

## 2021-07-09 ENCOUNTER — HOSPITAL ENCOUNTER (OUTPATIENT)
Dept: CARDIAC REHAB | Age: 51
Setting detail: THERAPIES SERIES
Discharge: HOME | End: 2021-07-09
Payer: OTHER GOVERNMENT

## 2021-07-09 PROCEDURE — 93798 PHYS/QHP OP CAR RHAB W/ECG: CPT

## 2021-07-12 ENCOUNTER — HOSPITAL ENCOUNTER (OUTPATIENT)
Dept: CARDIAC REHAB | Age: 51
Setting detail: THERAPIES SERIES
Discharge: HOME | End: 2021-07-12
Payer: OTHER GOVERNMENT

## 2021-07-12 PROCEDURE — 93798 PHYS/QHP OP CAR RHAB W/ECG: CPT

## 2021-07-14 ENCOUNTER — HOSPITAL ENCOUNTER (OUTPATIENT)
Dept: CARDIAC REHAB | Age: 51
Setting detail: THERAPIES SERIES
Discharge: HOME | End: 2021-07-14
Payer: OTHER GOVERNMENT

## 2021-07-14 PROCEDURE — 93798 PHYS/QHP OP CAR RHAB W/ECG: CPT

## 2021-07-16 ENCOUNTER — APPOINTMENT (OUTPATIENT)
Dept: CARDIAC REHAB | Age: 51
End: 2021-07-16
Payer: OTHER GOVERNMENT

## 2021-07-19 ENCOUNTER — HOSPITAL ENCOUNTER (OUTPATIENT)
Dept: CARDIAC REHAB | Age: 51
Setting detail: THERAPIES SERIES
Discharge: HOME | End: 2021-07-19
Payer: OTHER GOVERNMENT

## 2021-07-19 PROCEDURE — 93798 PHYS/QHP OP CAR RHAB W/ECG: CPT

## 2021-07-21 ENCOUNTER — HOSPITAL ENCOUNTER (OUTPATIENT)
Dept: CARDIAC REHAB | Age: 51
Setting detail: THERAPIES SERIES
Discharge: HOME | End: 2021-07-21
Payer: OTHER GOVERNMENT

## 2021-07-21 PROCEDURE — 93798 PHYS/QHP OP CAR RHAB W/ECG: CPT

## 2021-07-23 ENCOUNTER — HOSPITAL ENCOUNTER (OUTPATIENT)
Dept: CARDIAC REHAB | Age: 51
Setting detail: THERAPIES SERIES
Discharge: HOME | End: 2021-07-23
Payer: OTHER GOVERNMENT

## 2021-07-23 PROCEDURE — 93798 PHYS/QHP OP CAR RHAB W/ECG: CPT

## 2021-07-26 ENCOUNTER — HOSPITAL ENCOUNTER (OUTPATIENT)
Dept: CARDIAC REHAB | Age: 51
Setting detail: THERAPIES SERIES
Discharge: HOME | End: 2021-07-26
Payer: OTHER GOVERNMENT

## 2021-07-26 PROCEDURE — 93798 PHYS/QHP OP CAR RHAB W/ECG: CPT

## 2021-07-28 ENCOUNTER — HOSPITAL ENCOUNTER (OUTPATIENT)
Dept: CARDIAC REHAB | Age: 51
Setting detail: THERAPIES SERIES
Discharge: HOME | End: 2021-07-28
Payer: OTHER GOVERNMENT

## 2021-07-28 PROCEDURE — 93798 PHYS/QHP OP CAR RHAB W/ECG: CPT

## 2021-07-30 ENCOUNTER — HOSPITAL ENCOUNTER (OUTPATIENT)
Dept: CARDIAC REHAB | Age: 51
Setting detail: THERAPIES SERIES
Discharge: HOME | End: 2021-07-30
Payer: OTHER GOVERNMENT

## 2021-07-30 PROCEDURE — 93798 PHYS/QHP OP CAR RHAB W/ECG: CPT

## 2021-08-02 ENCOUNTER — HOSPITAL ENCOUNTER (OUTPATIENT)
Dept: CARDIAC REHAB | Age: 51
Setting detail: THERAPIES SERIES
Discharge: HOME | End: 2021-08-02
Payer: OTHER GOVERNMENT

## 2021-08-02 PROCEDURE — 93798 PHYS/QHP OP CAR RHAB W/ECG: CPT

## 2021-08-04 ENCOUNTER — HOSPITAL ENCOUNTER (OUTPATIENT)
Dept: CARDIAC REHAB | Age: 51
Setting detail: THERAPIES SERIES
Discharge: HOME | End: 2021-08-04
Payer: OTHER GOVERNMENT

## 2021-08-04 PROCEDURE — 93798 PHYS/QHP OP CAR RHAB W/ECG: CPT

## 2021-08-06 ENCOUNTER — APPOINTMENT (OUTPATIENT)
Dept: CARDIAC REHAB | Age: 51
End: 2021-08-06
Payer: OTHER GOVERNMENT

## 2021-08-09 ENCOUNTER — APPOINTMENT (OUTPATIENT)
Dept: CARDIAC REHAB | Age: 51
End: 2021-08-09
Payer: OTHER GOVERNMENT

## 2021-08-11 ENCOUNTER — APPOINTMENT (OUTPATIENT)
Dept: CARDIAC REHAB | Age: 51
End: 2021-08-11
Payer: OTHER GOVERNMENT

## 2021-08-13 ENCOUNTER — HOSPITAL ENCOUNTER (OUTPATIENT)
Dept: CARDIAC REHAB | Age: 51
Setting detail: THERAPIES SERIES
End: 2021-08-13
Payer: OTHER GOVERNMENT

## 2021-08-16 ENCOUNTER — APPOINTMENT (OUTPATIENT)
Dept: CARDIAC REHAB | Age: 51
End: 2021-08-16
Payer: OTHER GOVERNMENT

## 2021-08-18 ENCOUNTER — HOSPITAL ENCOUNTER (OUTPATIENT)
Dept: CARDIAC REHAB | Age: 51
Setting detail: THERAPIES SERIES
Discharge: HOME | End: 2021-08-18
Payer: OTHER GOVERNMENT

## 2021-08-18 PROCEDURE — 93798 PHYS/QHP OP CAR RHAB W/ECG: CPT

## 2021-08-20 ENCOUNTER — HOSPITAL ENCOUNTER (OUTPATIENT)
Dept: CARDIAC REHAB | Age: 51
Setting detail: THERAPIES SERIES
Discharge: HOME | End: 2021-08-20
Payer: OTHER GOVERNMENT

## 2021-08-20 PROCEDURE — 93798 PHYS/QHP OP CAR RHAB W/ECG: CPT

## 2021-08-23 ENCOUNTER — HOSPITAL ENCOUNTER (OUTPATIENT)
Dept: CARDIAC REHAB | Age: 51
Setting detail: THERAPIES SERIES
Discharge: HOME | End: 2021-08-23
Payer: OTHER GOVERNMENT

## 2021-08-23 PROCEDURE — 93798 PHYS/QHP OP CAR RHAB W/ECG: CPT

## 2021-08-25 ENCOUNTER — APPOINTMENT (OUTPATIENT)
Dept: CARDIAC REHAB | Age: 51
End: 2021-08-25
Payer: OTHER GOVERNMENT

## 2021-08-25 ENCOUNTER — HOSPITAL ENCOUNTER (OUTPATIENT)
Dept: CARDIAC REHAB | Age: 51
Setting detail: THERAPIES SERIES
Discharge: HOME | End: 2021-08-25
Payer: OTHER GOVERNMENT

## 2021-08-25 PROCEDURE — 93798 PHYS/QHP OP CAR RHAB W/ECG: CPT

## 2021-08-27 ENCOUNTER — HOSPITAL ENCOUNTER (OUTPATIENT)
Dept: CARDIAC REHAB | Age: 51
Setting detail: THERAPIES SERIES
Discharge: HOME | End: 2021-08-27
Payer: OTHER GOVERNMENT

## 2021-08-27 PROCEDURE — 93798 PHYS/QHP OP CAR RHAB W/ECG: CPT

## 2021-08-30 ENCOUNTER — HOSPITAL ENCOUNTER (OUTPATIENT)
Dept: CARDIAC REHAB | Age: 51
Setting detail: THERAPIES SERIES
Discharge: HOME | End: 2021-08-30
Payer: OTHER GOVERNMENT

## 2021-08-30 PROCEDURE — 93798 PHYS/QHP OP CAR RHAB W/ECG: CPT

## 2021-09-01 ENCOUNTER — HOSPITAL ENCOUNTER (OUTPATIENT)
Dept: CARDIAC REHAB | Age: 51
Setting detail: THERAPIES SERIES
Discharge: HOME | End: 2021-09-01
Payer: OTHER GOVERNMENT

## 2021-09-01 PROCEDURE — 93798 PHYS/QHP OP CAR RHAB W/ECG: CPT

## 2021-09-03 ENCOUNTER — HOSPITAL ENCOUNTER (OUTPATIENT)
Dept: CARDIAC REHAB | Age: 51
Setting detail: THERAPIES SERIES
Discharge: HOME | End: 2021-09-03
Payer: OTHER GOVERNMENT

## 2021-09-03 PROCEDURE — 93798 PHYS/QHP OP CAR RHAB W/ECG: CPT

## 2021-09-08 ENCOUNTER — HOSPITAL ENCOUNTER (OUTPATIENT)
Dept: CARDIAC REHAB | Age: 51
Setting detail: THERAPIES SERIES
Discharge: HOME | End: 2021-09-08
Payer: OTHER GOVERNMENT

## 2021-09-08 PROCEDURE — 93798 PHYS/QHP OP CAR RHAB W/ECG: CPT

## 2021-09-09 ENCOUNTER — HOSPITAL ENCOUNTER (OUTPATIENT)
Dept: CARDIOLOGY | Age: 51
Discharge: HOME | End: 2021-09-09
Payer: COMMERCIAL

## 2021-09-09 ENCOUNTER — OFFICE VISIT (OUTPATIENT)
Dept: CARDIOLOGY CLINIC | Age: 51
End: 2021-09-09
Payer: COMMERCIAL

## 2021-09-09 VITALS
DIASTOLIC BLOOD PRESSURE: 70 MMHG | HEIGHT: 71 IN | SYSTOLIC BLOOD PRESSURE: 118 MMHG | WEIGHT: 207 LBS | HEART RATE: 78 BPM | BODY MASS INDEX: 28.98 KG/M2 | OXYGEN SATURATION: 98 %

## 2021-09-09 DIAGNOSIS — I77.810 AORTIC ROOT DILATATION (HCC): ICD-10-CM

## 2021-09-09 DIAGNOSIS — I21.02 ST ELEVATION MYOCARDIAL INFARCTION INVOLVING LEFT ANTERIOR DESCENDING (LAD) CORONARY ARTERY (HCC): ICD-10-CM

## 2021-09-09 DIAGNOSIS — D3A.098 BENIGN CARCINOID TUMOR OF OTHER SITES: ICD-10-CM

## 2021-09-09 DIAGNOSIS — E78.2 MIXED HYPERLIPIDEMIA: ICD-10-CM

## 2021-09-09 DIAGNOSIS — E55.9 VITAMIN D DEFICIENCY: ICD-10-CM

## 2021-09-09 DIAGNOSIS — I71.20 THORACIC AORTIC ANEURYSM WITHOUT RUPTURE: ICD-10-CM

## 2021-09-09 DIAGNOSIS — I25.5 ISCHEMIC CARDIOMYOPATHY: Primary | ICD-10-CM

## 2021-09-09 DIAGNOSIS — I47.20 VT (VENTRICULAR TACHYCARDIA): ICD-10-CM

## 2021-09-09 DIAGNOSIS — I25.10 CORONARY ARTERY DISEASE INVOLVING NATIVE CORONARY ARTERY OF NATIVE HEART WITHOUT ANGINA PECTORIS: ICD-10-CM

## 2021-09-09 DIAGNOSIS — I25.5 ISCHEMIC CARDIOMYOPATHY: ICD-10-CM

## 2021-09-09 LAB
LV EF: 50 %
LVEF MODALITY: NORMAL

## 2021-09-09 PROCEDURE — 93306 TTE W/DOPPLER COMPLETE: CPT

## 2021-09-09 PROCEDURE — 99214 OFFICE O/P EST MOD 30 MIN: CPT | Performed by: INTERNAL MEDICINE

## 2021-09-09 NOTE — PROGRESS NOTES
Damon Roberts [address] [facility]   Cardiovascular Evaluation    PATIENT: Etienne Soto  DATE: 2021  MRN: [id number]  [facility]: [id number]  : 1970      Primary Care Doctor: Sudhir Lemon  Reason for evaluation:   Follow-up      Subjective:   History of present illness on initial date of evaluation:   Etienne Soto is a 45 y.o. patient who presents for hospital follow up. He presented to the emergency room on 2021 after waking up with substernal chest pain that woke him from his sleep. He took a full dose of Aspirin prior to the squad arriving. His EKG in the squad was concerning for STEMI, upon arrival this was confirmed. In the emergency room he had one episode of torsade de pointes and was cardioverted not requiring CPR. He had a number of episodes of VT requiring further cardioversion's. He was started on Amiodarone and Lidocaine boluses and drips and prior to urgently going to the cath lab. His angiogram resulted in percutaneous coronary intervention to the proximal and mid LAD for plaque rupture. His ejection fraction was noted to be 20% at that time. His post stent echo showed an EF of 30-35%. He was discharged with a life vest.    Last OV 21 he reported that he has been feeling well since his stenting and discharge from the hospital. He does have chest discomfort that he relates to the shocking and procedure its self. He states that the night before his event, he felt indigestion and took a TUMs. He is unsure if this helped as he feel asleep. He also states that the weekend prior, he did strenuous physical labor and was abnormally fatigued the days after. Kita his wife feels that he did have color changes in face and lips the week leading up to his event. Carrie Larry has been taking his medications as prescribed without any known side effects. He did have episodes of feeling that he had to catch his breath in the middle of the night.  This only occurred for the week after his stenting and has dissipated since. This could be attributed to his Brilinta. Patient denied any weight gain, edema, palpitations, shortness of breath, dizziness, and syncope. Echo reading 06/08/21 showed EF 40-45%. New finding of Aortic Aneurysm at 3.9 cm. Today his preliminary echo shows improved EF at 50-53%. He reports he feels well overall. He denies CP, SOB, dizziness or syncope. He had a carotid doppler at the [facility] which was normal. He is still going to Cardiac Rehab. Patient Active Problem List   Diagnosis    [facility]ocrine [facility] ([facility]    Coronary artery disease    Ischemic cardiomyopathy    ST elevation myocardial infarction involving left anterior descending (LAD) coronary artery ([facility]    [facility]    [facility]) [facility]    Aortic aneurysm without rupture ([facility]         Past Medical History:   has a past medical history of Endocrine cancer ([facility] Heart attack [facility] Hyperlipidemia, and Tumor cells. Surgical History:   has a past surgical history that includes knee surgery (2012); Upper gastrointestinal endoscopy (4/18/2016); liver biopsy (5/10/16); Colonoscopy (5/12/16); and Carotid stent placement (04/02/2021). Social History:   reports that he has never smoked. He has never used smokeless tobacco. He reports current alcohol use. He reports that he does not use drugs. Family History:  No evidence for sudden cardiac death or premature CAD    Home Medications:  Reviewed and are listed in nursing record.  and/or listed below  Current Outpatient Medications   Medication Sig Dispense Refill    naproxen (NAPROSYN) 500 MG tablet Take 500 mg by mouth 2 times daily (with meals)      ticagrelor (BRILINTA) 90 MG TABS tablet Take 1 tablet by mouth 2 times daily 60 tablet 5    lanreotide acetate (SOMATULINE) 120 MG/0.5ML SOLN depot injection Inject 120 mg into the skin once      metoprolol succinate (TOPROL XL) 25 MG extended release tablet Take 1 tablet by mouth daily 30 tablet 5    aspirin 81 MG chewable tablet Take 1 tablet by mouth daily 30 tablet 5    lisinopril (PRINIVIL;ZESTRIL) 5 MG tablet Take 1 tablet by mouth daily 30 tablet 5    rosuvastatin (CRESTOR) 20 MG tablet Take 1 tablet by mouth nightly 30 tablet 5    nitroGLYCERIN (NITROSTAT) 0.4 MG SL tablet Place 1 tablet under the tongue every 5 minutes as needed for Chest pain 25 tablet 3    allopurinol (ZYLOPRIM) 100 MG tablet Take 100 mg by mouth daily (Patient not taking: Reported on 9/9/2021)      furosemide (LASIX) 20 MG tablet Take 1 tablet by mouth daily (Patient not taking: Reported on 9/9/2021) 30 tablet 1    potassium chloride (KLOR-CON M) 10 MEQ extended release tablet Take 1 tablet by mouth daily (Patient not taking: Reported on 5/10/2021) 30 tablet 1    Multiple Vitamin (MULTIVITAMIN) capsule Take 1 capsule by mouth daily (Patient not taking: Reported on 6/8/2021)       No current facility-administered medications for this visit. Allergies:  Zocor [simvastatin]     Review of Systems:   A 14 point review of symptoms completed. Pertinent positives identified in the HPI, all other review of symptoms negative as below.     Objective:   PHYSICAL EXAM:    Vitals:    09/09/21 1018   BP: 118/70   Pulse: 78   SpO2: 98%    Weight: 207 lb (93.9 kg)     Wt Readings from Last 3 Encounters:   09/09/21 207 lb (93.9 kg)   06/08/21 203 lb 8 oz (92.3 kg)   04/14/21 208 lb (94.3 kg)         General Appearance:  Alert, cooperative, no distress, appears stated age   Head:  Normocephalic, atraumatic   Eyes:  PERRL, conjunctiva/corneas clear   Nose: Nares normal, no drainage or sinus tenderness   Throat: Lips, mucosa, and tongue normal   Neck: Supple, symmetrical, trachea midline, NL thyroid no carotid bruit or JVD   Lungs:   CTAB, respirations unlabored   Chest Wall:  No tenderness or deformity   Heart:  Regular rhythm and normal rate; S1, S2 are normal;   no murmur noted; no rub or gallop   Abdomen: Soft, non-tender, +BS x 4, no masses, no organomegaly   Extremities: Extremities normal, atraumatic, no cyanosis or edema   Pulses: 2+ and symmetric   Skin: Skin color, texture, turgor normal, no rashes or lesions   Pysch: Normal mood and affect   Neurologic: Normal gross motor and sensory exam.         LABS   CBC:      Lab Results   Component Value Date    WBC 4.9 2021    RBC 3.96 2021    RBC 5.31 2016    HGB 13.9 2021    HCT 39.4 2021    MCV 99.5 2021    RDW 14.5 2021     2021     CMP:  Lab Results   Component Value Date     2021    K 4.3 2021    K 3.3 2021    CL 98 2021    CO2 27 2021    BUN 14 2021    CREATININE 0.9 2021    GFRAA >60 2021    AGRATIO 1.8 2021    LABGLOM >60 2021    GLUCOSE 122 2021    GLUCOSE 94 2016    PROT 7.1 2021    PROT 7.9 2016    CALCIUM 8.4 2021    BILITOT 0.4 2021    ALKPHOS 44 2021    AST 36 2021    ALT 34 2021     PT/INR:   No results found for: PTINR  Liver:  No components found for: CHLPL  Lab Results   Component Value Date    ALT 34 2021    AST 36 2021    GGT 48 (H) 2012    ALKPHOS 44 2021    BILITOT 0.4 2021     No results found for: LABA1C  Lipids:         Lab Results   Component Value Date    TRIG 170 (H) 2021            Lab Results   Component Value Date    HDL 46 2021    HDL 38 (L) 2021            Lab Results   Component Value Date    LDLCALC 53 2021    LDLCALC 152 (H) 2021            Lab Results   Component Value Date    LABVLDL 21 2021    LABVLDL 34 2021         CARDIAC DATA   EK21  SR HR 77      Echo 2021. Personally reviewed prelim showed EF continues to improve now about 50 to 53% by [facility]'s. There is only some very minor hypokinesis of the anterior septal wall.   Aortic root is stable at 3.9 cm    Limited echo: 06/08/21  Limited echo for LV function. -- The left ventricular systolic function is mildly reduced with an ejection   fraction of 45 %. There is hypokinesis of the apex, apical and anterior wall   segments. Normal left ventricular diastolic filling pressure. Mildly dilated ascending aorta at 3.92cm   IVC is normal in size (< 2.1 cm) and collapses > 50% with respiration   consistent with normal right atrial pressure (3 mmHg). Compared to last echo on 4/3/2021 (EF 30-35%), left ventricle systolic   function has improved. ECHO:  4/3/2021  Summary   The left ventricular systolic function is moderately reduced with an   ejection fraction of 30 - 35 %. There is severe hypokinesis of the anterior and apical walls consistent with   infarction within the territory of the left anterior descending artery. Normal left ventricle size and wall thickness. Grade I diastolic dysfunction with normal filling pressure. MUGA:    STRESS TEST:    CARDIAC CATH: 4/2/2021  Results of intervention      Lesion 1: prox LAD  Stent: Resolute [place] 3.5 x 38  Pre:   90% stenosis, KAUR 1 flow  Post: 0% stenosis, KAUR 3 flow  Suspect Diag 1 jailed and lost     Assessment/Plan  1. Successful percutaneous intervention to the proximal and mid LAD for plaque rupture event. Doing 1 drug-eluting stent. This was further optimized by OCT imaging. 2.  Note there is a hazy spot in the ostial LAD that under OCT imaging was proven to be fibroatheromatous disease. There is no critical stenosis, no dissection no thrombus. 3.  Integrilin x6 hours. 4.  Aspirin 81 mg p.o. daily, ticagrelor 90 mg p.o. twice daily for 1 year minimum  5. We will start beta-blocker, high-dose statin, cardiac rehab phase 1 and 2, echocardiogram  6. Of note patient is on experimental chemotherapy we will therefore notify the study coordinators.                -Note this study drug supposedly affects QTC prolongation.   I did not appreciate any QT prolongation on initial EKG but potassium was low at 3.3 will need to be repleted. VASCULAR/OTHER IMAGING:      Assessment and Plan   Paul Sun is a 45 y.o. male who presents today for the following problems:      1. CAD   - 4/2/2021 PCI to prox LAD (complicated by torsades/VT requiring cardioversion in ER)  2. Ischemic Cardiomyopathy. Continues to improve    LVEF 30 to 35%. --45%--> 50-53%  3. NSVT: resolved  4. HLD: continued  5. History of carcinoid neuroendocrine cancer  6. Ascending aortic aneurysm: New   3.92 cm recent echo  7. Low vit D: 27    MD Plan: 1. He feels excellent with no symptoms. Does describe some heart rate that takes a while to decrease following exercise did talk about aerobic activities to help with this cardiac conditioning  2. We will update echo in 1 year for ascending aortic aneurysm. 3.  We will update LFTs given history of carcinoid on statin as well as vitamin D levels  4. Okay to return to work without restriction as tolerated. 5.  We will decrease ticagrelor to 60 mg p.o. twice daily in April 0.22          Patient Active Problem List   Diagnosis    [facility]    Coronary artery disease    Ischemic cardiomyopathy    ST elevation myocardial infarction involving left anterior descending (LAD) coronary artery ([facility]    STEMI (ST elevation myocardial infarction) ([facility]    VT (ventricular tachycardia) ([facility]    [facility]       Patient Plan:  1. Echo in 1 year for heart function and dilated aorta  2. No physical restrictions at this time. Increase aerobic activity. Aerobic heart rate 120s. 3. Return to work without restrictions  4. Discussed decreasing brilinta in April 2022  5. Liver function and Vit D  6. Follow up in 6 months      It is a pleasure to assist in the care of Paul Sun. Please call with any questions. Scribe's attestation:   This note was scribed in the presence of Dr. Shayla Centeno by Reny Kuhn RN      I, Shayla Centeno MD, personally performed the services described in this documentation as scribed by the above signed scribe in my presence, and it is both accurate and complete to the best of our ability and knowledge. I agree with the details independently gathered by my clinical support staff, while the remaining scribed note accurately describes my personal service to the patient. The above RN is working as a scribe for and in the presence of myself . Working as a scribe, the RN may have prepopulated components of this note with my historical intellectual property under my direct supervision. Any additions to this intellectual property were performed at my direction. Furthermore, the content and accuracy of this note have been reviewed by me to the best of my ability.           Pam Gonzalez MD, [facility] Cardiologist  [address]  [phone] [facility]  [phone] [address] [place] [address]

## 2021-09-09 NOTE — PATIENT INSTRUCTIONS
Patient Plan:  1. Echo in 1 year for heart function and dilated aorta  2. No physical restrictions at this time. Increase aerobic activity. Aerobic heart rate 120s. 3. Return to work without restrictions  4. Discussed decreasing brilinta in April 2022  5. Liver function and Vit D  6. Follow up in 6 months      Your provider has ordered testing for further evaluation. An order/prescription has been included in your paper work.  To schedule outpatient testing, contact Central Scheduling by calling [address] [place], [place] [address] ([phone]).

## 2021-09-09 NOTE — LETTER
Colin Dang [address]   Cardiovascular Evaluation    PATIENT: Nabor Zamudio  DATE: 2021  MRN: [id number]  [facility]: [id number]  : 1970      Primary Care Doctor: Leslie Bridges  Reason for evaluation:   Follow-up      Subjective:   History of present illness on initial date of evaluation:   Nabor Zamudio is a 45 y.o. patient who presents for hospital follow up. He presented to the emergency room on 2021 after waking up with substernal chest pain that woke him from his sleep. He took a full dose of Aspirin prior to the squad arriving. His EKG in the squad was concerning for STEMI, upon arrival this was confirmed. In the emergency room he had one episode of torsade de pointes and was cardioverted not requiring CPR. He had a number of episodes of VT requiring further cardioversion's. He was started on Amiodarone and Lidocaine boluses and drips and prior to urgently going to the cath lab. His angiogram resulted in percutaneous coronary intervention to the proximal and mid LAD for plaque rupture. His ejection fraction was noted to be 20% at that time. His post stent echo showed an EF of 30-35%. He was discharged with a life vest.    Last OV 21 he reported that he has been feeling well since his stenting and discharge from the hospital. He does have chest discomfort that he relates to the shocking and procedure its self. He states that the night before his event, he felt indigestion and took a TUMs. He is unsure if this helped as he feel asleep. He also states that the weekend prior, he did strenuous physical labor and was abnormally fatigued the days after. Kita his wife feels that he did have color changes in face and lips the week leading up to his event. Cristóbal Benson has been taking his medications as prescribed without any known side effects. He did have episodes of feeling that he had to catch his breath in the middle of the night.  This only occurred for the week after his stenting and has dissipated since. This could be attributed to his Brilinta. Patient denied any weight gain, edema, palpitations, shortness of breath, dizziness, and syncope. Echo reading 06/08/21 showed EF 40-45%. New finding of Aortic Aneurysm at 3.9 cm. Today his preliminary echo shows improved EF at 50-53%. He reports he feels well overall. He denies CP, SOB, dizziness or syncope. He had a carotid doppler at the [place] which was normal. He is still going to Cardiac Rehab. Patient Active Problem List   Diagnosis    [facility]endocrine [facility] ([facility]    Coronary artery disease    Ischemic cardiomyopathy    ST elevation myocardial infarction involving left anterior descending (LAD) coronary artery ([facility]    [facility]    VT [facility]ventricular tachycardia) [facility]    Aortic aneurysm without rupture ([facility]         Past Medical History:   has a past medical history of Endocrine cancer ([facility] Heart attack ([facility] Hyperlipidemia, and Tumor cells. Surgical History:   has a past surgical history that includes knee surgery (2012); Upper gastrointestinal endoscopy (4/18/2016); liver biopsy (5/10/16); Colonoscopy (5/12/16); and Carotid stent placement (04/02/2021). Social History:   reports that he has never smoked. He has never used smokeless tobacco. He reports current alcohol use. He reports that he does not use drugs. Family History:  No evidence for sudden cardiac death or premature CAD    Home Medications:  Reviewed and are listed in nursing record.  and/or listed below  Current Outpatient Medications   Medication Sig Dispense Refill    naproxen (NAPROSYN) 500 MG tablet Take 500 mg by mouth 2 times daily (with meals)      ticagrelor (BRILINTA) 90 MG TABS tablet Take 1 tablet by mouth 2 times daily 60 tablet 5    lanreotide acetate (SOMATULINE) 120 MG/0.5ML SOLN depot injection Inject 120 mg into the skin once      metoprolol succinate (TOPROL XL) 25 MG extended release tablet Take 1 tablet by mouth daily 30 tablet 5    aspirin 81 MG chewable tablet Take 1 tablet by mouth daily 30 tablet 5    lisinopril (PRINIVIL;ZESTRIL) 5 MG tablet Take 1 tablet by mouth daily 30 tablet 5    rosuvastatin (CRESTOR) 20 MG tablet Take 1 tablet by mouth nightly 30 tablet 5    nitroGLYCERIN (NITROSTAT) 0.4 MG SL tablet Place 1 tablet under the tongue every 5 minutes as needed for Chest pain 25 tablet 3    allopurinol (ZYLOPRIM) 100 MG tablet Take 100 mg by mouth daily (Patient not taking: Reported on 9/9/2021)      furosemide (LASIX) 20 MG tablet Take 1 tablet by mouth daily (Patient not taking: Reported on 9/9/2021) 30 tablet 1    potassium chloride (KLOR-CON M) 10 MEQ extended release tablet Take 1 tablet by mouth daily (Patient not taking: Reported on 5/10/2021) 30 tablet 1    Multiple Vitamin (MULTIVITAMIN) capsule Take 1 capsule by mouth daily (Patient not taking: Reported on 6/8/2021)       No current facility-administered medications for this visit. Allergies:  Zocor [simvastatin]     Review of Systems:   A 14 point review of symptoms completed. Pertinent positives identified in the HPI, all other review of symptoms negative as below.     Objective:   PHYSICAL EXAM:    Vitals:    09/09/21 1018   BP: 118/70   Pulse: 78   SpO2: 98%    Weight: 207 lb (93.9 kg)     Wt Readings from Last 3 Encounters:   09/09/21 207 lb (93.9 kg)   06/08/21 203 lb 8 oz (92.3 kg)   04/14/21 208 lb (94.3 kg)         General Appearance:  Alert, cooperative, no distress, appears stated age   Head:  Normocephalic, atraumatic   Eyes:  PERRL, conjunctiva/corneas clear   Nose: Nares normal, no drainage or sinus tenderness   Throat: Lips, mucosa, and tongue normal   Neck: Supple, symmetrical, trachea midline, NL thyroid no carotid bruit or JVD   Lungs:   CTAB, respirations unlabored   Chest Wall:  No tenderness or deformity   Heart:  Regular rhythm and normal rate; S1, S2 are normal;   no murmur noted; no rub or gallop cm    Limited echo: 06/08/21  Limited echo for LV function. -- The left ventricular systolic function is mildly reduced with an ejection   fraction of 45 %. There is hypokinesis of the apex, apical and anterior wall   segments. Normal left ventricular diastolic filling pressure. Mildly dilated ascending aorta at 3.92cm   IVC is normal in size (< 2.1 cm) and collapses > 50% with respiration   consistent with normal right atrial pressure (3 mmHg). Compared to last echo on 4/3/2021 (EF 30-35%), left ventricle systolic   function has improved. ECHO:  4/3/2021  Summary   The left ventricular systolic function is moderately reduced with an   ejection fraction of 30 - 35 %. There is severe hypokinesis of the anterior and apical walls consistent with   infarction within the territory of the left anterior descending artery. Normal left ventricle size and wall thickness. Grade I diastolic dysfunction with normal filling pressure. MUGA:    STRESS TEST:    CARDIAC CATH: 4/2/2021  Results of intervention      Lesion 1: prox LAD  Stent: Resolute Handy 3.5 x 38  Pre:   90% stenosis, KAUR 1 flow  Post: 0% stenosis, KAUR 3 flow  Suspect Diag 1 jailed and lost     Assessment/Plan  1. Successful percutaneous intervention to the proximal and mid LAD for plaque rupture event. Doing 1 drug-eluting stent. This was further optimized by OCT imaging. 2.  Note there is a hazy spot in the ostial LAD that under OCT imaging was proven to be fibroatheromatous disease. There is no critical stenosis, no dissection no thrombus. 3.  Integrilin x6 hours. 4.  Aspirin 81 mg p.o. daily, ticagrelor 90 mg p.o. twice daily for 1 year minimum  5. We will start beta-blocker, high-dose statin, cardiac rehab phase 1 and 2, echocardiogram  6. Of note patient is on experimental chemotherapy we will therefore notify the study coordinators.                -Note this study drug supposedly affects QTC prolongation.   I did not appreciate any QT prolongation on initial EKG but potassium was low at 3.3 will need to be repleted. VASCULAR/OTHER IMAGING:      Assessment and Plan   Christophe Vidal is a 45 y.o. male who presents today for the following problems:      1. CAD   - 4/2/2021 PCI to prox LAD (complicated by torsades/VT requiring cardioversion in ER)  2. Ischemic Cardiomyopathy. Continues to improve    LVEF 30 to 35%. --45%--> 50-53%  3. NSVT: resolved  4. HLD: continued  5. History of carcinoid neuroendocrine cancer  6. Ascending aortic aneurysm: New   3.92 cm recent echo  7. Low vit D: 27    MD Plan: 1. He feels excellent with no symptoms. Does describe some heart rate that takes a while to decrease following exercise did talk about aerobic activities to help with this cardiac conditioning  2. We will update echo in 1 year for ascending aortic aneurysm. 3.  We will update LFTs given history of carcinoid on statin as well as vitamin D levels  4. Okay to return to work without restriction as tolerated. 5.  We will decrease ticagrelor to 60 mg p.o. twice daily in April 0.22          Patient Active Problem List   Diagnosis    [facility]ocrine [facility] ([facility]    Coronary artery disease    Ischemic cardiomyopathy    ST elevation myocardial infarction involving left anterior descending (LAD) coronary artery ([facility]    STEMI (ST elevation myocardial infarction) ([facility]    VT (ventricular tachycardia) ([facility]    Aortic aneurysm without rupture ([facility]       Patient Plan:  1. Echo in 1 year for heart function and dilated aorta  2. No physical restrictions at this time. Increase aerobic activity. Aerobic heart rate 120s. 3. Return to work without restrictions  4. Discussed decreasing brilinta in April 2022  5. Liver function and Vit D  6. Follow up in 6 months      It is a pleasure to assist in the care of Christophe Vidal. Please call with any questions. Scribe's attestation:   This note was scribed in the presence of Dr. De Patino by Nicki Green RN Cristal Palomino MD, personally performed the services described in this documentation as scribed by the above signed scribe in my presence, and it is both accurate and complete to the best of our ability and knowledge. I agree with the details independently gathered by my clinical support staff, while the remaining scribed note accurately describes my personal service to the patient. The above RN is working as a scribe for and in the presence of myself . Working as a scribe, the RN may have prepopulated components of this note with my historical intellectual property under my direct supervision. Any additions to this intellectual property were performed at my direction. Furthermore, the content and accuracy of this note have been reviewed by me to the best of my ability.           Julito Hoyos MD, [address] [facility] Cardiologist  [address]  [phone] [facility]  [phone] [facility]

## 2021-09-09 NOTE — LETTER
[address] Cardiology - [address] [place] [address] [facility]  Phone: [phone]  Fax: [phone]    Kashif Duenas MD        September 9, 2021    Jian Solano  [address] [facility]  [place] [address]      To Whom It May Concern,     Jian Russ 1970 may return to work without restrictions on 09/20/21. If you have any questions or concerns, please don't hesitate to call.     Sincerely,        Kashif Duenas MD

## 2021-09-10 ENCOUNTER — HOSPITAL ENCOUNTER (OUTPATIENT)
Age: 51
Discharge: HOME | End: 2021-09-10
Payer: COMMERCIAL

## 2021-09-10 ENCOUNTER — HOSPITAL ENCOUNTER (OUTPATIENT)
Dept: CARDIAC REHAB | Age: 51
Setting detail: THERAPIES SERIES
Discharge: HOME | End: 2021-09-10
Payer: OTHER GOVERNMENT

## 2021-09-10 DIAGNOSIS — E55.9 VITAMIN D DEFICIENCY: ICD-10-CM

## 2021-09-10 DIAGNOSIS — E78.2 MIXED HYPERLIPIDEMIA: ICD-10-CM

## 2021-09-10 LAB
ALBUMIN SERPL-MCNC: 4.6 G/DL (ref 3.4–5)
ALP BLD-CCNC: 46 U/L (ref 40–129)
ALT SERPL-CCNC: 33 U/L (ref 10–40)
AST SERPL-CCNC: 29 U/L (ref 15–37)
BILIRUB SERPL-MCNC: 0.7 MG/DL (ref 0–1)
BILIRUBIN DIRECT: <0.2 MG/DL (ref 0–0.3)
BILIRUBIN, INDIRECT: NORMAL MG/DL (ref 0–1)
TOTAL PROTEIN: 7.6 G/DL (ref 6.4–8.2)
VITAMIN D 25-HYDROXY: 33 NG/ML

## 2021-09-10 PROCEDURE — 36415 COLL VENOUS BLD VENIPUNCTURE: CPT

## 2021-09-10 PROCEDURE — 80076 HEPATIC FUNCTION PANEL: CPT

## 2021-09-10 PROCEDURE — 93798 PHYS/QHP OP CAR RHAB W/ECG: CPT

## 2021-09-10 PROCEDURE — 82306 VITAMIN D 25 HYDROXY: CPT

## 2021-09-14 ENCOUNTER — TELEPHONE (OUTPATIENT)
Dept: CARDIOLOGY CLINIC | Age: 51
End: 2021-09-14

## 2021-09-15 ENCOUNTER — HOSPITAL ENCOUNTER (OUTPATIENT)
Dept: CARDIAC REHAB | Age: 51
Setting detail: THERAPIES SERIES
Discharge: HOME | End: 2021-09-15
Payer: OTHER GOVERNMENT

## 2021-09-15 PROCEDURE — 93798 PHYS/QHP OP CAR RHAB W/ECG: CPT

## 2021-09-17 ENCOUNTER — APPOINTMENT (OUTPATIENT)
Dept: CARDIAC REHAB | Age: 51
End: 2021-09-17
Payer: OTHER GOVERNMENT

## 2021-09-20 ENCOUNTER — APPOINTMENT (OUTPATIENT)
Dept: CARDIAC REHAB | Age: 51
End: 2021-09-20
Payer: OTHER GOVERNMENT

## 2021-09-22 ENCOUNTER — APPOINTMENT (OUTPATIENT)
Dept: CARDIAC REHAB | Age: 51
End: 2021-09-22
Payer: OTHER GOVERNMENT

## 2021-09-24 ENCOUNTER — APPOINTMENT (OUTPATIENT)
Dept: CARDIAC REHAB | Age: 51
End: 2021-09-24
Payer: OTHER GOVERNMENT

## 2022-05-05 ENCOUNTER — TELEPHONE (OUTPATIENT)
Dept: CARDIOLOGY CLINIC | Age: 52
End: 2022-05-05

## 2022-05-05 NOTE — TELEPHONE ENCOUNTER
Offered pt openings at [facility] 6/17. Pt states he wants to go to [facility] 7/7/22.

## 2022-05-05 NOTE — TELEPHONE ENCOUNTER
Pt had to cancel  appt with jenn due to a sudden passing of family member/. First available is 22. Are we able to work the pt in sooner at [facility]?

## 2022-05-05 NOTE — TELEPHONE ENCOUNTER
Let pt know that I will leave this in dr moreno's basket for review to determine options for appts. Thank you.

## 2022-05-09 NOTE — TELEPHONE ENCOUNTER
I am always willing to fit Chato in earlier.   He can be added to the end of the day whenever he would like

## 2022-07-05 NOTE — PROGRESS NOTES
only occurred for the week after his stenting and has dissipated since. This could be attributed to his Brilinta. Patient denied any weight gain, edema, palpitations, shortness of breath, dizziness, and syncope. Echo reading 06/08/21 showed EF 40-45%. New finding of Aortic Aneurysm at 3.9 cm. Last OV 09/09/21 his preliminary echo shows improved EF at 50-53%. He reports he felt well. He denies CP, SOB, dizziness or syncope. He had a carotid doppler at the [place] which was normal. Participating in cardiac rehab. Today he states he has no complaints today. He states he had episode of chest discomfort. He describes as a twinge in his chest. He states this differs from heart event. He states he participates in completing yard work lifting multiple heavy rocks, denied any chest discomfort with activity. He states he tolerated this activity well. Patient currently denies any weight gain, edema, palpitations, chest pain, shortness of breath, dizziness, and syncope. He is taking all medications as prescribed, tolerating well. Denies recent issues with bleeding or bruising. He states he gets his prescriptions from the [place]. Patient Active Problem List   Diagnosis    [facility] [address]    Coronary artery disease    Ischemic cardiomyopathy    ST elevation myocardial infarction involving left anterior descending (LAD) coronary artery ([facility] [address] [facility]    STEMI (ST elevation myocardial infarction) ([facility] [address] [facility]    VT (ventricular tachycardia) ([facility] [address]    Aortic aneurysm without rupture ([facility] [address]         Past Medical History:   has a past medical history of Endocrine cancer ([facility] [address] Heart attack ([address] Hyperlipidemia, and Tumor cells. Surgical History:   has a past surgical history that includes knee surgery (2012); Upper gastrointestinal endoscopy (4/18/2016); liver biopsy (5/10/16); Colonoscopy (5/12/16); and Carotid stent placement (04/02/2021). Social History:   reports that he has never smoked.  He has never used smokeless tobacco. He reports current alcohol use. He reports that he does not use drugs. Family History:  No evidence for sudden cardiac death or premature CAD    Home Medications:  Reviewed and are listed in nursing record. and/or listed below  Current Outpatient Medications   Medication Sig Dispense Refill    naproxen (NAPROSYN) 500 MG tablet Take 500 mg by mouth 2 times daily (with meals)      ticagrelor (BRILINTA) 90 MG TABS tablet Take 1 tablet by mouth 2 times daily 60 tablet 5    lanreotide acetate (SOMATULINE) 120 MG/0.5ML SOLN depot injection Inject 120 mg into the skin once      metoprolol succinate (TOPROL XL) 25 MG extended release tablet Take 1 tablet by mouth daily 30 tablet 5    aspirin 81 MG chewable tablet Take 1 tablet by mouth daily 30 tablet 5    lisinopril (PRINIVIL;ZESTRIL) 5 MG tablet Take 1 tablet by mouth daily 30 tablet 5    rosuvastatin (CRESTOR) 20 MG tablet Take 1 tablet by mouth nightly 30 tablet 5    nitroGLYCERIN (NITROSTAT) 0.4 MG SL tablet Place 1 tablet under the tongue every 5 minutes as needed for Chest pain 25 tablet 3    allopurinol (ZYLOPRIM) 100 MG tablet Take 100 mg by mouth daily (Patient not taking: Reported on 9/9/2021)      furosemide (LASIX) 20 MG tablet Take 1 tablet by mouth daily (Patient not taking: Reported on 9/9/2021) 30 tablet 1    potassium chloride (KLOR-CON M) 10 MEQ extended release tablet Take 1 tablet by mouth daily (Patient not taking: Reported on 5/10/2021) 30 tablet 1    Multiple Vitamin (MULTIVITAMIN) capsule Take 1 capsule by mouth daily (Patient not taking: Reported on 6/8/2021)       No current facility-administered medications for this visit. Allergies:  Zocor [simvastatin]     Review of Systems:   A 14 point review of symptoms completed. Pertinent positives identified in the HPI, all other review of symptoms negative as below.     Objective:   PHYSICAL EXAM:    Vitals:    07/07/22 0829   BP: 102/62   Pulse: 90   SpO2: 97% Weight: 215 lb (97.5 kg)     Wt Readings from Last 3 Encounters:   07/07/22 215 lb (97.5 kg)   09/09/21 207 lb (93.9 kg)   06/08/21 203 lb 8 oz (92.3 kg)         General Appearance:  Alert, cooperative, no distress, appears stated age   Head:  Normocephalic, atraumatic   Eyes:  PERRL, conjunctiva/corneas clear   Nose: Nares normal, no drainage or sinus tenderness   Throat: Lips, mucosa, and tongue normal   Neck: Supple, symmetrical, trachea midline, NL thyroid no carotid bruit or JVD   Lungs:   CTAB, respirations unlabored   Chest Wall:  No tenderness or deformity   Heart:  Regular rhythm and normal rate; S1, S2 are normal;   no murmur noted; no rub or gallop   Abdomen:   Soft, non-tender, +BS x 4, no masses, no organomegaly   Extremities: Extremities normal, atraumatic, no cyanosis or edema   Pulses: 2+ and symmetric   Skin: Skin color, texture, turgor normal, no rashes or lesions   Pysch: Normal mood and affect   Neurologic: Normal gross motor and sensory exam.         LABS   CBC:      Lab Results   Component Value Date/Time    WBC 4.9 04/05/2021 04:25 AM    RBC 3.96 04/05/2021 04:25 AM    RBC 5.31 04/22/2016 09:41 AM    HGB 13.9 04/05/2021 04:25 AM    HCT 39.4 04/05/2021 04:25 AM    MCV 99.5 04/05/2021 04:25 AM    RDW 14.5 04/05/2021 04:25 AM     04/05/2021 04:25 AM     CMP:  Lab Results   Component Value Date/Time     04/05/2021 04:25 AM    K 4.3 04/05/2021 04:25 AM    K 3.3 04/02/2021 05:57 AM    CL 98 04/05/2021 04:25 AM    CO2 27 04/05/2021 04:25 AM    BUN 14 04/05/2021 04:25 AM    CREATININE 0.9 04/05/2021 04:25 AM    GFRAA >60 04/05/2021 04:25 AM    AGRATIO 1.8 04/05/2021 04:25 AM    LABGLOM >60 04/05/2021 04:25 AM    GLUCOSE 122 04/05/2021 04:25 AM    GLUCOSE 94 04/22/2016 09:41 AM    PROT 7.6 09/10/2021 09:49 AM    PROT 7.9 04/22/2016 09:41 AM    CALCIUM 8.4 04/05/2021 04:25 AM    BILITOT 0.7 09/10/2021 09:49 AM    ALKPHOS 46 09/10/2021 09:49 AM    AST 29 09/10/2021 09:49 AM    ALT 33 09/10/2021 09:49 AM     PT/INR:   No results found for: PTINR  Liver:  No components found for: CHLPL  Lab Results   Component Value Date    ALT 33 09/10/2021    AST 29 09/10/2021    GGT 48 (H) 2012    ALKPHOS 46 09/10/2021    BILITOT 0.7 09/10/2021     No results found for: LABA1C  Lipids:         Lab Results   Component Value Date    TRIG 170 (H) 2021            Lab Results   Component Value Date    HDL 46 2021    HDL 38 (L) 2021            Lab Results   Component Value Date    LDLCALC 53 2021    LDLCALC 152 (H) 2021            Lab Results   Component Value Date    LABVLDL 21 2021    LABVLDL 34 2021         CARDIAC DATA   EK21  SR HR 77      Echo 2021. Personally reviewed prelim showed EF continues to improve now about 50 to 53% by [facility]. There is only some very minor hypokinesis of the anterior septal wall. Aortic root is stable at 3.9 cm    Limited echo: 21  Limited echo for LV function. -- The left ventricular systolic function is mildly reduced with an ejection   fraction of 45 %. There is hypokinesis of the apex, apical and anterior wall   segments. Normal left ventricular diastolic filling pressure. Mildly dilated ascending aorta at 3.92cm   IVC is normal in size (< 2.1 cm) and collapses > 50% with respiration   consistent with normal right atrial pressure (3 mmHg). Compared to last echo on 4/3/2021 (EF 30-35%), left ventricle systolic   function has improved. ECHO:  4/3/2021  Summary   The left ventricular systolic function is moderately reduced with an   ejection fraction of 30 - 35 %. There is severe hypokinesis of the anterior and apical walls consistent with   infarction within the territory of the left anterior descending artery. Normal left ventricle size and wall thickness. Grade I diastolic dysfunction with normal filling pressure.       MUGA:    STRESS TEST:    CARDIAC CATH: 2021  Results of intervention Lesion 1: prox LAD  Stent: Resolute Handy 3.5 x 38  Pre:   90% stenosis, KAUR 1 flow  Post: 0% stenosis, KAUR 3 flow  Suspect Diag 1 jailed and lost     Assessment/Plan  1. Successful percutaneous intervention to the proximal and mid LAD for plaque rupture event. Doing 1 drug-eluting stent. This was further optimized by OCT imaging. 2.  Note there is a hazy spot in the ostial LAD that under OCT imaging was proven to be fibroatheromatous disease. There is no critical stenosis, no dissection no thrombus. 3.  Integrilin x6 hours. 4.  Aspirin 81 mg p.o. daily, ticagrelor 90 mg p.o. twice daily for 1 year minimum  5. We will start beta-blocker, high-dose statin, cardiac rehab phase 1 and 2, echocardiogram  6. Of note patient is on experimental chemotherapy we will therefore notify the study coordinators.                -Note this study drug supposedly affects QTC prolongation. I did not appreciate any QT prolongation on initial EKG but potassium was low at 3.3 will need to be repleted. VASCULAR/OTHER IMAGING:      Assessment and Plan   Elijah Hicks is a 47 y.o. male who presents today for the following problems:      1. CAD   - 4/2/2021 PCI to prox LAD (complicated by torsades/VT requiring cardioversion in ER)  2. Ischemic Cardiomyopathy. Continues to improve    LVEF 30 to 35%. --45%--> 50-53%  3. NSVT: resolved  4. HLD: continued  5. History of carcinoid neuroendocrine cancer  6. Ascending aortic aneurysm: New   3.92 cm recent echo  7. Low vit D: 27-->33    MD Plan:  1. Doing extremely well with no chest pain. Moving heavy 100 pound rocks in the heat with no problems or angina  2. We will drop ticagrelor to 60 mg p.o. twice daily. 3.  Update echo for ascending aortic aneurysm which was fairly mild  4. Continue aspirin, lisinopril, Toprol. Crestor.           Patient Active Problem List   Diagnosis    [facility] [address]    Coronary artery disease    Ischemic cardiomyopathy    ST elevation myocardial infarction involving left anterior descending (LAD) coronary artery ([facility])    STEMI (ST elevation myocardial infarction) ([facility])    [facility]) [facility] [address]    Aortic aneurysm without rupture ([facility] [address]       Patient Plan:  1. Order lipid panel and liver function studies ~ please be fasting, evaluate cholesterol and liver function. 2. Reduce Brilinta 60 mg tablet twice daily ~ coronary artery disease  3. Order Echocardiogram ~ evaluate thoracic aortic aneurysm  ~A test that records movement of your heart valves and chambers by ultrasound. Evaluates heart valves, any chamber enlargement, abnormal openings, or any fluid in the sac surrounding the heart. 4. Please call office if you have chest pain or discomfort discussed ordering stress test.   5. Follow up in one year. It is a pleasure to assist in the care of Rainer Grady. Please call with any questions. Scribe's attestation: This note was scribed in the presence of Farnaz Fay by Ernestina Orellana MD, personally performed the services described in this documentation as scribed by the above signed scribe in my presence, and it is both accurate and complete to the best of our ability and knowledge. I agree with the details independently gathered by my clinical support staff, while the remaining scribed note accurately describes my personal service to the patient. The above RN is working as a scribe for and in the presence of myself . Working as a scribe, the RN may have prepopulated components of this note with my historical intellectual property under my direct supervision. Any additions to this intellectual property were performed at my direction. Furthermore, the content and accuracy of this note have been reviewed by me to the best of my ability.           Ernestina Berrios MD, [address] [facility] Cardiologist  [facility], [place], operated by [facility]  [phone] [facility]  [phone] [address] [place] [address]

## 2022-07-07 ENCOUNTER — OFFICE VISIT (OUTPATIENT)
Dept: CARDIOLOGY CLINIC | Age: 52
End: 2022-07-07
Payer: COMMERCIAL

## 2022-07-07 VITALS
HEIGHT: 71 IN | BODY MASS INDEX: 30.1 KG/M2 | WEIGHT: 215 LBS | OXYGEN SATURATION: 97 % | HEART RATE: 90 BPM | SYSTOLIC BLOOD PRESSURE: 102 MMHG | DIASTOLIC BLOOD PRESSURE: 62 MMHG

## 2022-07-07 DIAGNOSIS — I21.02 ST ELEVATION MYOCARDIAL INFARCTION INVOLVING LEFT ANTERIOR DESCENDING (LAD) CORONARY ARTERY (HCC): ICD-10-CM

## 2022-07-07 DIAGNOSIS — I71.20 THORACIC AORTIC ANEURYSM WITHOUT RUPTURE: ICD-10-CM

## 2022-07-07 DIAGNOSIS — I25.10 CORONARY ARTERY DISEASE INVOLVING NATIVE CORONARY ARTERY OF NATIVE HEART WITHOUT ANGINA PECTORIS: Primary | ICD-10-CM

## 2022-07-07 DIAGNOSIS — Z79.899 MEDICATION MANAGEMENT: ICD-10-CM

## 2022-07-07 DIAGNOSIS — I25.5 ISCHEMIC CARDIOMYOPATHY: ICD-10-CM

## 2022-07-07 PROCEDURE — 99214 OFFICE O/P EST MOD 30 MIN: CPT | Performed by: INTERNAL MEDICINE

## 2022-07-07 NOTE — PATIENT INSTRUCTIONS
Your provider has ordered testing for further evaluation. An order/prescription has been included in your paper work.  To schedule outpatient testing, contact Central Scheduling by calling [employer] ([phone]). Patient Plan:  1. Order lipid panel and liver function studies ~ please be fasting, evaluate cholesterol and liver function. 2. Reduce Brilinta 60 mg tablet twice daily ~ coronary artery disease  3. Order Echocardiogram ~ evaluate thoracic aortic aneurysm  ~A test that records movement of your heart valves and chambers by ultrasound. Evaluates heart valves, any chamber enlargement, abnormal openings, or any fluid in the sac surrounding the heart. 4. Please call office if you have chest pain or discomfort discussed ordering stress test.   5. Follow up in one year.

## 2022-08-05 ENCOUNTER — APPOINTMENT (OUTPATIENT)
Dept: CT IMAGING | Age: 52
End: 2022-08-05
Payer: OTHER GOVERNMENT

## 2022-08-05 ENCOUNTER — TELEPHONE (OUTPATIENT)
Dept: CARDIOLOGY | Age: 52
End: 2022-08-05

## 2022-08-05 ENCOUNTER — HOSPITAL ENCOUNTER (OUTPATIENT)
Dept: CARDIOLOGY | Age: 52
Discharge: HOME | End: 2022-08-05
Payer: OTHER GOVERNMENT

## 2022-08-05 ENCOUNTER — HOSPITAL ENCOUNTER (EMERGENCY)
Age: 52
Discharge: HOME | End: 2022-08-05
Attending: EMERGENCY MEDICINE
Payer: OTHER GOVERNMENT

## 2022-08-05 ENCOUNTER — HOSPITAL ENCOUNTER (OUTPATIENT)
Age: 52
Discharge: HOME | End: 2022-08-05
Payer: OTHER GOVERNMENT

## 2022-08-05 VITALS
HEART RATE: 74 BPM | SYSTOLIC BLOOD PRESSURE: 127 MMHG | HEIGHT: 71 IN | DIASTOLIC BLOOD PRESSURE: 81 MMHG | WEIGHT: 215 LBS | RESPIRATION RATE: 20 BRPM | TEMPERATURE: 98.5 F | BODY MASS INDEX: 30.1 KG/M2 | OXYGEN SATURATION: 98 %

## 2022-08-05 DIAGNOSIS — Z79.899 MEDICATION MANAGEMENT: ICD-10-CM

## 2022-08-05 DIAGNOSIS — I77.810 AORTIC ROOT DILATATION (HCC): Primary | ICD-10-CM

## 2022-08-05 DIAGNOSIS — I71.20 THORACIC AORTIC ANEURYSM WITHOUT RUPTURE: ICD-10-CM

## 2022-08-05 LAB
ABO/RH: NORMAL
ALBUMIN SERPL-MCNC: 4.5 G/DL (ref 3.4–5)
ALBUMIN SERPL-MCNC: 4.8 G/DL (ref 3.4–5)
ALP BLD-CCNC: 49 U/L (ref 40–129)
ALP BLD-CCNC: 57 U/L (ref 40–129)
ALT SERPL-CCNC: 28 U/L (ref 10–40)
ALT SERPL-CCNC: 33 U/L (ref 10–40)
ANION GAP SERPL CALCULATED.3IONS-SCNC: 11 MMOL/L (ref 3–16)
ANTIBODY SCREEN: NORMAL
APTT: 29.3 SEC (ref 23–34.3)
AST SERPL-CCNC: 24 U/L (ref 15–37)
AST SERPL-CCNC: 29 U/L (ref 15–37)
BASOPHILS ABSOLUTE: 0 K/UL (ref 0–0.2)
BASOPHILS RELATIVE PERCENT: 0.4 %
BILIRUB SERPL-MCNC: 0.5 MG/DL (ref 0–1)
BILIRUB SERPL-MCNC: 0.6 MG/DL (ref 0–1)
BILIRUBIN DIRECT: <0.2 MG/DL (ref 0–0.3)
BILIRUBIN DIRECT: <0.2 MG/DL (ref 0–0.3)
BILIRUBIN, INDIRECT: NORMAL MG/DL (ref 0–1)
BILIRUBIN, INDIRECT: NORMAL MG/DL (ref 0–1)
BUN BLDV-MCNC: 10 MG/DL (ref 7–20)
C-REACTIVE PROTEIN: 3.5 MG/L (ref 0–5.1)
CALCIUM SERPL-MCNC: 10.1 MG/DL (ref 8.3–10.6)
CHLORIDE BLD-SCNC: 100 MMOL/L (ref 99–110)
CHOLESTEROL, TOTAL: 113 MG/DL (ref 0–199)
CO2: 27 MMOL/L (ref 21–32)
CREAT SERPL-MCNC: 1 MG/DL (ref 0.9–1.3)
EOSINOPHILS ABSOLUTE: 0.1 K/UL (ref 0–0.6)
EOSINOPHILS RELATIVE PERCENT: 1.6 %
GFR AFRICAN AMERICAN: >60
GFR NON-AFRICAN AMERICAN: >60
GLUCOSE BLD-MCNC: 106 MG/DL (ref 70–99)
HCT VFR BLD CALC: 43.7 % (ref 40.5–52.5)
HDLC SERPL-MCNC: 37 MG/DL (ref 40–60)
HEMOGLOBIN: 14.7 G/DL (ref 13.5–17.5)
INR BLD: 1.04 (ref 0.87–1.14)
LDL CHOLESTEROL CALCULATED: 44 MG/DL
LV EF: 48 %
LVEF MODALITY: NORMAL
LYMPHOCYTES ABSOLUTE: 1.5 K/UL (ref 1–5.1)
LYMPHOCYTES RELATIVE PERCENT: 27.4 %
MCH RBC QN AUTO: 30.3 PG (ref 26–34)
MCHC RBC AUTO-ENTMCNC: 33.6 G/DL (ref 31–36)
MCV RBC AUTO: 90 FL (ref 80–100)
MONOCYTES ABSOLUTE: 0.5 K/UL (ref 0–1.3)
MONOCYTES RELATIVE PERCENT: 8.4 %
NEUTROPHILS ABSOLUTE: 3.4 K/UL (ref 1.7–7.7)
NEUTROPHILS RELATIVE PERCENT: 62.2 %
PDW BLD-RTO: 13.7 % (ref 12.4–15.4)
PLATELET # BLD: 234 K/UL (ref 135–450)
PMV BLD AUTO: 7.6 FL (ref 5–10.5)
POTASSIUM SERPL-SCNC: 4.3 MMOL/L (ref 3.5–5.1)
PRO-BNP: 58 PG/ML (ref 0–124)
PROTHROMBIN TIME: 13.4 SEC (ref 11.7–14.5)
RBC # BLD: 4.86 M/UL (ref 4.2–5.9)
SODIUM BLD-SCNC: 138 MMOL/L (ref 136–145)
TOTAL PROTEIN: 6.7 G/DL (ref 6.4–8.2)
TOTAL PROTEIN: 7.7 G/DL (ref 6.4–8.2)
TRIGL SERPL-MCNC: 159 MG/DL (ref 0–150)
TROPONIN: <0.01 NG/ML
VLDLC SERPL CALC-MCNC: 32 MG/DL
WBC # BLD: 5.5 K/UL (ref 4–11)

## 2022-08-05 PROCEDURE — 70498 CT ANGIOGRAPHY NECK: CPT

## 2022-08-05 PROCEDURE — 80061 LIPID PANEL: CPT

## 2022-08-05 PROCEDURE — 6360000004 HC RX CONTRAST MEDICATION: Performed by: PHYSICIAN ASSISTANT

## 2022-08-05 PROCEDURE — 86140 C-REACTIVE PROTEIN: CPT

## 2022-08-05 PROCEDURE — 93306 TTE W/DOPPLER COMPLETE: CPT

## 2022-08-05 PROCEDURE — 71275 CT ANGIOGRAPHY CHEST: CPT

## 2022-08-05 PROCEDURE — 85610 PROTHROMBIN TIME: CPT

## 2022-08-05 PROCEDURE — 83880 ASSAY OF NATRIURETIC PEPTIDE: CPT

## 2022-08-05 PROCEDURE — 84484 ASSAY OF TROPONIN QUANT: CPT

## 2022-08-05 PROCEDURE — 80076 HEPATIC FUNCTION PANEL: CPT

## 2022-08-05 PROCEDURE — 86850 RBC ANTIBODY SCREEN: CPT

## 2022-08-05 PROCEDURE — 86901 BLOOD TYPING SEROLOGIC RH(D): CPT

## 2022-08-05 PROCEDURE — 85730 THROMBOPLASTIN TIME PARTIAL: CPT

## 2022-08-05 PROCEDURE — 99285 EMERGENCY DEPT VISIT HI MDM: CPT

## 2022-08-05 PROCEDURE — 86900 BLOOD TYPING SEROLOGIC ABO: CPT

## 2022-08-05 PROCEDURE — 85025 COMPLETE CBC W/AUTO DIFF WBC: CPT

## 2022-08-05 PROCEDURE — 80048 BASIC METABOLIC PNL TOTAL CA: CPT

## 2022-08-05 PROCEDURE — 36415 COLL VENOUS BLD VENIPUNCTURE: CPT

## 2022-08-05 PROCEDURE — 93005 ELECTROCARDIOGRAM TRACING: CPT | Performed by: PHYSICIAN ASSISTANT

## 2022-08-05 RX ADMIN — IOPAMIDOL 150 ML: 755 INJECTION, SOLUTION INTRAVENOUS at 19:53

## 2022-08-05 ASSESSMENT — PAIN - FUNCTIONAL ASSESSMENT: PAIN_FUNCTIONAL_ASSESSMENT: NONE - DENIES PAIN

## 2022-08-05 NOTE — TELEPHONE ENCOUNTER
Called and spoke to patient and wife. Reviewed echocardiogram from today and there does appear to be some enlargement of his ascending aorta/root. Also this area was much better seen on today's echo compared to September of last year. There is a linear structure that is seen on multiple views that is extremely concerning for possible ascending aortic dissection. No color flow behind and some unusual aortic arch anatomy that is not well delineated due to resolution of echo      Patient himself is currently having no chest pain or back pain feeling okay. I did ask him to go immediately to the emergency room for a chest and neck CTA to evaluate for any aortic dissection or extension. Patient will come in to [facility] emergency room. I did let Quintin Maldonado our charge nurse know.

## 2022-08-05 NOTE — ED PROVIDER NOTES
[facility]  ED  EMERGENCY DEPARTMENT ENCOUNTER        Pt Name: Ankita Singh  MRN: [id number]  [place] 1970  Date of evaluation: 8/5/2022  Provider: Keren Tomas PA-C  PCP: No primary care provider on file. Note Started: 7:31 PM EDT        I have seen and evaluated this patient with my supervising physician Sha Cintron    CHIEF COMPLAINT       Chief Complaint   Patient presents with    Abnormal Test Results     possible dissection Ascending aorta per echo, needs imaging, coming POV, please call Dr. Pari Hugo with questions/results       HISTORY OF PRESENT ILLNESS   (Location, Timing/Onset, Context/Setting, Quality, Duration, Modifying Factors, Severity, Associated Signs and Symptoms)  Note limiting factors. Chief Complaint: Sent by cardiology for possible dissection    Ankita Singh is a 47 y.o. male who presents with his wife. They were instructed to go to ER by Dr. Pari Hugo cardiology. Patient had his routine echo follow-up today showing an aortic root dilation of 4.2 cm, ascending aorta 3.9 cm and a proximal ascending tear potential to suggest dissection. Referred to ED for further evaluation and imaging. The study obtained today is compared with a study of 9/9/2021. That study did show evidence of an aortic root dilation at 3.9-4.0 cm and the ascending aorta at 3.6-3.7 cm. Today's study shows clear change. The patient is however, completely asymptomatic with chest pain, shortness of breath, tearing sensation, pain referred to shoulders, neck, back, jaw and arms. He does not exhibit any abdominal complaints. It was relayed by cardiology the patient to have imaging of both chest and neck. Patient is status postcardiac arrest 4/2/2021 with 100% LAD occlusion with stent placement. Nursing Notes were all reviewed and agreed with or any disagreements were addressed in the HPI.     REVIEW OF SYSTEMS    (2-9 systems for level 4, 10 or more for level 5)     Review of Systems    Positives and Pertinent negatives as per HPI. Except as noted above in the ROS, all other systems were reviewed and negative.        PAST MEDICAL HISTORY     Past Medical History:   Diagnosis Date    Endocrine cancer ([facility] [address]     Heart attack ([facility] [address] 04/2021    Hyperlipidemia     Tumor cells 4/18/16    [facility] Endocrine Tumor- in Stomach         SURGICAL HISTORY     Past Surgical History:   Procedure Laterality Date    CAROTID STENT PLACEMENT  04/02/2021    LAD    COLONOSCOPY  5/12/16    polyps    KNEE SURGERY  2012    LIVER BIOPSY  5/10/16    CT guided    UPPER GASTROINTESTINAL ENDOSCOPY  4/18/2016         CURRENTMEDICATIONS       Previous Medications    ALLOPURINOL (ZYLOPRIM) 100 MG TABLET    Take 100 mg by mouth daily    ASPIRIN 81 MG CHEWABLE TABLET    Take 1 tablet by mouth daily    FUROSEMIDE (LASIX) 20 MG TABLET    Take 1 tablet by mouth daily    LANREOTIDE ACETATE (SOMATULINE) 120 MG/0.5ML SOLN DEPOT INJECTION    Inject 120 mg into the skin once    LISINOPRIL (PRINIVIL;ZESTRIL) 5 MG TABLET    Take 1 tablet by mouth daily    METOPROLOL SUCCINATE (TOPROL XL) 25 MG EXTENDED RELEASE TABLET    Take 1 tablet by mouth daily    MULTIPLE VITAMIN (MULTIVITAMIN) CAPSULE    Take 1 capsule by mouth daily    NAPROXEN (NAPROSYN) 500 MG TABLET    Take 500 mg by mouth 2 times daily (with meals)    NITROGLYCERIN (NITROSTAT) 0.4 MG SL TABLET    Place 1 tablet under the tongue every 5 minutes as needed for Chest pain    POTASSIUM CHLORIDE (KLOR-CON M) 10 MEQ EXTENDED RELEASE TABLET    Take 1 tablet by mouth daily    ROSUVASTATIN (CRESTOR) 20 MG TABLET    Take 1 tablet by mouth nightly    TICAGRELOR (BRILINTA) 60 MG TABS TABLET    Take 1 tablet by mouth 2 times daily    TICAGRELOR (BRILINTA) 60 MG TABS TABLET    Take 1 tablet by mouth 2 times daily         ALLERGIES     Zocor [simvastatin]    FAMILYHISTORY       Family History   Problem Relation Age of Onset    High Cholesterol Mother     Heart Disease Mother     High Cholesterol Father     Asthma Sister     Cancer Maternal Grandfather         Possible leukemia          SOCIAL HISTORY       Social History     Tobacco Use    Smoking status: Never    Smokeless tobacco: Never   Substance Use Topics    Alcohol use: Yes     Alcohol/week: 0.0 standard drinks     Comment: socially weekends 5 beers on weekend    Drug use: No       SCREENINGS    Doris Coma Scale  Eye Opening: Spontaneous  Best Verbal Response: Oriented  Best Motor Response: Obeys commands  [place] Coma Scale Score: 15        PHYSICAL EXAM    (up to 7 for level 4, 8 or more for level 5)     ED Triage Vitals [08/05/22 1915]   BP Temp Temp Source Heart Rate Resp SpO2 Height Weight   (!) 136/98 98.5 °F (36.9 °C) Oral 80 21 98 % -- --       Physical Exam  Vitals and nursing note reviewed. Constitutional:       Appearance: Normal appearance. He is well-developed and normal weight. HENT:      Head: Normocephalic and atraumatic. Right Ear: External ear normal.      Left Ear: External ear normal.   Eyes:      General: No scleral icterus. Right eye: No discharge. Left eye: No discharge. Conjunctiva/sclera: Conjunctivae normal.   Cardiovascular:      Rate and Rhythm: Normal rate and regular rhythm. Heart sounds: Normal heart sounds. Pulmonary:      Effort: Pulmonary effort is normal.      Breath sounds: Normal breath sounds. Chest:      Chest wall: No tenderness. Abdominal:      General: Abdomen is flat. Bowel sounds are normal.      Palpations: Abdomen is soft. Tenderness: no abdominal tenderness   Musculoskeletal:         General: Normal range of motion. Cervical back: Normal range of motion and neck supple. Right lower leg: No edema. Left lower leg: No edema. Skin:     General: Skin is warm and dry. Neurological:      General: No focal deficit present. Mental Status: He is alert and oriented to person, place, and time. Mental status is at baseline. Psychiatric:         Mood and Affect: Mood normal.         Behavior: Behavior normal.         Thought Content: Thought content normal.         Judgment: Judgment normal.       DIAGNOSTIC RESULTS   LABS:    Labs Reviewed   BASIC METABOLIC PANEL - Abnormal; Notable for the following components:       Result Value    Glucose 106 (*)     All other components within normal limits   CBC WITH AUTO DIFFERENTIAL   HEPATIC FUNCTION PANEL   PROTIME-INR   APTT   TROPONIN   BRAIN NATRIURETIC PEPTIDE   C-REACTIVE PROTEIN   TYPE AND SCREEN   TYPE AND SCREEN       When ordered only abnormal lab results are displayed. All other labs were within normal range or not returned as of this dictation. EKG: When ordered, EKG's are interpreted by the Emergency Department Physician in the absence of a cardiologist.  Please see their note for interpretation of EKG. RADIOLOGY:   Non-plain film images such as CT, Ultrasound and MRI are read by the radiologist. Plain radiographic images are visualized and preliminarily interpreted by the ED Provider with the below findings:        Interpretation per the Radiologist below, if available at the time of this note:    CTA NECK W CONTRAST   Final Result   No dissection visualized within the neck. CTA CHEST W CONTRAST   Final Result   Aneurysmal dilatation of the aortic root with distension of the sinuses of   Valsalva. No evidence of dissection. Coronary artery disease. Hepatic metastatic disease. Metastatic periportal lymphadenopathy.            Echo 2D w doppler w color complete    Result Date: 8/5/2022  Transthoracic Echocardiography Report (TTE)  Demographics   Patient Name      Sangeeta Owusu   Date of Study     08/05/2022         Gender              Male   Patient Number    [id number]         Date of Birth       1970   Visit Number      [id number]          Age                 47 year(s)   Accession Number  [id number]         Room Number         OP   Corporate ID [id number]           Sonographer         Serenity Ramos  Physician         MD                 Physician           Patt Waters MD  Procedure Type of Study   TTE procedure:ECHOCARDIOGRAM COMPLETE 2D W DOPPLER W COLOR. Procedure Date Date: 08/05/2022 Start: 08:10 AM Study Location: [facility] Technical Quality: Adequate visualization Indications: Aortic aneurysm. Patient Status: Routine Height: 71 inches Weight: 215 pounds BSA: 2.17 m2 BMI: 29.99 kg/m2 HR: 74 bpm BP: 102/62 mmHg  Conclusions   Summary  -- Left ventricular systolic function is mildly reduced with a 3D calculated  EF of 48%. The left ventricle is normal in size with normal wall thickness. Mild anterior hypokinesis. Grade I diastolic dysfunction with normal LV  pressure. -- The aortic root is dilated at 4.2 cm. The ascending aorta is dilated at  3.9 cm. Certain images from the parasternal long axis are suggestive of a  possible aortic dissection at the proximal ascending aorta, there is no  color flow in this area. -- Cannot exclude a thrombus at the proximal left common carotid. The IVC is  dilated in size (>2.1 cm) but collapses >50% with respiration consistent  with elevated right atrial pressures (8 mmHg) . Signature   ------------------------------------------------------------------  Electronically signed by Patt Waters MD (Interpreting  physician) on 08/05/2022 at 06:18 PM  ------------------------------------------------------------------   Findings   Left Ventricle  Left ventricular systolic function is mildly reduced with a 3D calculated EF  of 48%. The left ventricle is normal in size with normal wall thickness. Mild  anterior hypokinesis. Grade I diastolic dysfunction with normal LV pressure. Mitral Valve  The mitral valve is normal in structure and function. No evidence of mitral regurgitation.    Left Atrium  The left atrium is normal in size. Aortic Valve  The aortic valve appears normal in structure and function. Trileaflet. No  stenosis. There is no evidence of aortic regurgitation. Aorta  The aortic root is dilated at 4.2 cm. The ascending aorta is dilated at 3.9 cm. Certain images from the parasternal long axis are suggestive of a possible  aortic dissection at the proximal ascending aorta, but this could not be  confirmed by imaging at the suprasternal notch and right sternal border. Cannot exclude a thrombus at the proximal left common carotid. Right Ventricle  The right ventricle is normal in size and function. TAPSE is measured at 19 mm. S velocity is measured at 9.0 cm/s. RV EF calculated by 3D at 54%. Tricuspid Valve  The tricuspid valve is normal in structure and function. No evidence of tricuspid valve regurgitation. Inadequate tricuspid valve regurgitation to estimate systolic pulmonary  artery pressure. Right Atrium  The right atrium is normal in size. Right atrial area is 16.5 cm2. Right atrial volume is 19.0 ml/m2. Pulmonic Valve  The pulmonic valve is normal in structure and function. No evidence of pulmonic regurgitation. Pericardial Effusion  No pericardial effusion noted. Pleural Effusion  No pleural effusion noted. Miscellaneous  The IVC is dilated in size (>2.1 cm) but collapses >50% with respiration  consistent with elevated right atrial pressures (8 mmHg) .   M-Mode/2D Measurements (cm)   LV Diastolic Dimension: 6.58 cm LV Systolic Dimension: 7.72 cm  LV Septum Diastolic: 8.56 cm  LV PW Diastolic: 9.45 cm        AO Root Dimension: 4.2 cm                                  AV Cusp Separation: 2.2 cm                                  LA Dimension: 2.9 cm  LVOT: 2.4 cm                    LA Area: 16.5 cm2                                  LA volume/Index: 37 ml /17 ml/m2  Doppler Measurements   AV Peak Velocity: 90.9 cm/s    MV Peak E-Wave: 57.8 cm/s  AV Peak Gradient: 3.31 mmHg    MV Peak A-Wave: 61.7 cm/s  LVOT Peak Velocity: 75.7 cm/s  MV E/A Ratio: 0.94   Estimated RAP:8 mmHg  E' Septal Velocity: 5.98 cm/s  E' Lateral Velocity: 4.13 cm/s  E/E' ratio: 11.9   Aortic Valve   Peak Velocity: 90.9 cm/s  Peak Gradient: 3.31 mmHg   Cusp Separation: 2.2 cm  Aorta   Aortic Root: 4.2 cm     Aortic Arch: 2.5 cm  Ascending Aorta: 3.9 cm Descending Aorta: 2.5 cm  LVOT Diameter: 2.4 cm            PROCEDURES   Unless otherwise noted below, none     Procedures    CRITICAL CARE TIME       CONSULTS:  None      EMERGENCY DEPARTMENT COURSE and DIFFERENTIAL DIAGNOSIS/MDM:   Vitals:    Vitals:    08/05/22 1959 08/05/22 2016 08/05/22 2031 08/05/22 2041   BP: (!) 124/91 115/84 122/86    Pulse: 81 84 75    Resp: 20 20 18    Temp:       TempSrc:    Oral   SpO2: 98% 98% 96%    Weight:    215 lb (97.5 kg)   Height:    5' 11\" (1.803 m)       Patient was given the following medications:  Medications   iopamidol (ISOVUE-370) 76 % injection 150 mL (150 mLs IntraVENous Given 8/5/22 1953)         Is this patient to be included in the SEP-1 Core Measure due to severe sepsis or septic shock? No   Exclusion criteria - the patient is NOT to be included for SEP-1 Core Measure due to: Infection is not suspected    The patient presenting from cardiology with concern for aortic root/ascending dissection. Patient asymptomatic. Exam unremarkable. CTA chest along with CTA neck both obtained. No evidence of dissection. Dilated aortic root at 5 cm. I did discuss with attending physician as well as Dr. nAn Camp. Patient will undergo a follow-up echo in the cardiology office next week. The patient and wife both expressed understanding of the diagnosis and the treatment plan. Case was discussed with attending physician who personally evaluated the patient    FINAL IMPRESSION      1.  Aortic root dilatation [facility])          DISPOSITION/PLAN   DISPOSITION Decision To Discharge 08/05/2022 08:45:02 PM      PATIENT REFERRED TO:  Josette Sears MD [facility] [address]  [phone]    Schedule an appointment as soon as possible for a visit on 8/10/2022      Your healthcare provider    Schedule an appointment as soon as possible for a visit   As needed    [facility]  ED  [address]  Go to   If symptoms worsen    DISCHARGE MEDICATIONS:  New Prescriptions    No medications on file       DISCONTINUED MEDICATIONS:  Discontinued Medications    No medications on file              (Please note that portions of this note were completed with a voice recognition program.  Efforts were made to edit the dictations but occasionally words are mis-transcribed. )    Tamara Bowers PA-C (electronically signed)           Tamara Bowers PA-C  08/05/22 [id number]

## 2022-08-06 LAB
EKG ATRIAL RATE: 76 BPM
EKG DIAGNOSIS: NORMAL
EKG P AXIS: 31 DEGREES
EKG P-R INTERVAL: 158 MS
EKG Q-T INTERVAL: 396 MS
EKG QRS DURATION: 96 MS
EKG QTC CALCULATION (BAZETT): 445 MS
EKG R AXIS: 71 DEGREES
EKG T AXIS: 55 DEGREES
EKG VENTRICULAR RATE: 76 BPM

## 2022-08-06 PROCEDURE — 93010 ELECTROCARDIOGRAM REPORT: CPT | Performed by: INTERNAL MEDICINE

## 2022-08-06 NOTE — ED NOTES
[facility] for d/c. Stable and ambulatory w/ wife.       Pritesh Acosta, MANUEL  08/05/22 2032

## 2022-08-09 ENCOUNTER — TELEPHONE (OUTPATIENT)
Dept: CARDIOLOGY CLINIC | Age: 52
End: 2022-08-09

## 2022-08-09 DIAGNOSIS — Z79.899 MEDICATION MANAGEMENT: ICD-10-CM

## 2022-08-09 DIAGNOSIS — I25.10 CORONARY ARTERY DISEASE INVOLVING NATIVE CORONARY ARTERY OF NATIVE HEART WITHOUT ANGINA PECTORIS: Primary | ICD-10-CM

## 2022-08-09 NOTE — TELEPHONE ENCOUNTER
Spoke to pt to relay message. V/U     Lab orders placed.     ----- Message from Francis Clayton MD sent at 8/9/2022  8:42 AM EDT -----  Let patient know their lipid test is stable, can consider fish oil, and if not already taking, recommend 1 g daily and follow-up lipids/LFTs in 1 to 2 months. Thanks.

## 2022-09-08 ENCOUNTER — TELEPHONE (OUTPATIENT)
Dept: CARDIOLOGY CLINIC | Age: 52
End: 2022-09-08

## 2022-09-08 DIAGNOSIS — I71.21 ASCENDING AORTIC ANEURYSM: Primary | ICD-10-CM

## 2022-09-08 NOTE — TELEPHONE ENCOUNTER
CHEKO, please advise-   Martinez Moulton advised pt to go to the hospital on 8/5/2022 for abnormal echo results.  Pt.'s wife wants to know if he needs another echo    Last OV CHEKO 7/7/2022

## 2022-11-03 ENCOUNTER — TELEPHONE (OUTPATIENT)
Dept: CARDIOLOGY CLINIC | Age: 52
End: 2022-11-03

## 2022-11-03 ENCOUNTER — HOSPITAL ENCOUNTER (OUTPATIENT)
Dept: CARDIOLOGY | Age: 52
Discharge: HOME | End: 2022-11-03
Payer: COMMERCIAL

## 2022-11-03 DIAGNOSIS — I71.21 ASCENDING AORTIC ANEURYSM: ICD-10-CM

## 2022-11-03 LAB
LV EF: 48 %
LVEF MODALITY: NORMAL

## 2022-11-03 PROCEDURE — 93308 TTE F-UP OR LMTD: CPT

## 2022-11-03 NOTE — TELEPHONE ENCOUNTER
----- Message from Ye Louise MD sent at 11/3/2022  2:05 PM EDT -----  Notified patient and his wife.   No significant change next echo 6 months to 1 year

## 2023-02-01 ENCOUNTER — TELEPHONE (OUTPATIENT)
Dept: CARDIOLOGY CLINIC | Age: 53
End: 2023-02-01

## 2023-02-01 NOTE — TELEPHONE ENCOUNTER
The patient's cardiac condition is not prohibitory to undergo surgery. The patient's cardiac risk is moderate based upon my evaluation and testing. Stable to proceed. Okay to hold brilinta for 5 days.  Would not stop asa 81mg

## 2023-02-01 NOTE — TELEPHONE ENCOUNTER
[facility] requests cardiac clearance for upcoming colonoscopy, date still to be determined. They request to hold ASA and Brilinta 5 days prior to procedure.  Please advise      Fax letter to [phone]

## 2023-02-01 NOTE — LETTER
[address] [address]  Phone: [phone]  Fax: [phone]    Mark Denney MD      Cardiac Clearance Letter  February 1, 2023    Alana Reed [address] [place] [address]   1970        The patient's cardiac condition is not prohibitory to undergo surgery. The patient's cardiac risk is moderate based upon my evaluation and testing. Stable to proceed.      Okay to hold brilinta for 5 days. Would not stop asa 81mg. If you have any questions or concerns, please don't hesitate to call.     Sincerely,        Mark Denney MD

## 2023-03-07 DIAGNOSIS — R07.9 CHEST PAIN, UNSPECIFIED TYPE: Primary | ICD-10-CM

## 2023-03-07 RX ORDER — NITROGLYCERIN 0.4 MG/1
0.4 TABLET SUBLINGUAL EVERY 5 MIN PRN
Qty: 25 TABLET | Refills: 3 | Status: SHIPPED | OUTPATIENT
Start: 2023-03-07

## 2024-09-04 ENCOUNTER — HOSPITAL ENCOUNTER (OUTPATIENT)
Age: 54
Discharge: HOME | End: 2024-09-04
Payer: COMMERCIAL

## 2024-09-04 LAB
ALBUMIN SERPL-MCNC: 4.3 G/DL (ref 3.4–5)
ALBUMIN/GLOB SERPL: 1.7 {RATIO} (ref 1.1–2.2)
ALP SERPL-CCNC: 49 U/L (ref 40–129)
ALT SERPL-CCNC: 27 U/L (ref 10–40)
ANION GAP SERPL CALCULATED.3IONS-SCNC: 10 MMOL/L (ref 3–16)
AST SERPL-CCNC: 28 U/L (ref 15–37)
BASOPHILS # BLD: 0 K/UL (ref 0–0.2)
BASOPHILS NFR BLD: 0.5 %
BILIRUB SERPL-MCNC: 0.5 MG/DL (ref 0–1)
BUN SERPL-MCNC: 16 MG/DL (ref 7–20)
CALCIUM SERPL-MCNC: 9.7 MG/DL (ref 8.3–10.6)
CHLORIDE SERPL-SCNC: 102 MMOL/L (ref 99–110)
CO2 SERPL-SCNC: 26 MMOL/L (ref 21–32)
CREAT SERPL-MCNC: 1 MG/DL (ref 0.9–1.3)
DEPRECATED RDW RBC AUTO: 14.2 % (ref 12.4–15.4)
EOSINOPHIL # BLD: 0.1 K/UL (ref 0–0.6)
EOSINOPHIL NFR BLD: 3.2 %
GFR SERPLBLD CREATININE-BSD FMLA CKD-EPI: 89 ML/MIN/{1.73_M2}
GLUCOSE SERPL-MCNC: 116 MG/DL (ref 70–99)
HCT VFR BLD AUTO: 40.9 % (ref 40.5–52.5)
HGB BLD-MCNC: 13.8 G/DL (ref 13.5–17.5)
INR PPP: 0.91 (ref 0.85–1.15)
LIPASE SERPL-CCNC: 28 U/L (ref 13–60)
LYMPHOCYTES # BLD: 1 K/UL (ref 1–5.1)
LYMPHOCYTES NFR BLD: 21.8 %
MCH RBC QN AUTO: 30 PG (ref 26–34)
MCHC RBC AUTO-ENTMCNC: 33.8 G/DL (ref 31–36)
MCV RBC AUTO: 88.6 FL (ref 80–100)
MONOCYTES # BLD: 0.4 K/UL (ref 0–1.3)
MONOCYTES NFR BLD: 9.6 %
NEUTROPHILS # BLD: 3 K/UL (ref 1.7–7.7)
NEUTROPHILS NFR BLD: 64.9 %
PLATELET # BLD AUTO: 231 K/UL (ref 135–450)
PMV BLD AUTO: 8.4 FL (ref 5–10.5)
POTASSIUM SERPL-SCNC: 4.8 MMOL/L (ref 3.5–5.1)
PROT SERPL-MCNC: 6.8 G/DL (ref 6.4–8.2)
PROTHROMBIN TIME: 12.5 SEC (ref 11.9–14.9)
RBC # BLD AUTO: 4.62 M/UL (ref 4.2–5.9)
SODIUM SERPL-SCNC: 138 MMOL/L (ref 136–145)
WBC # BLD AUTO: 4.6 K/UL (ref 4–11)

## 2024-09-04 PROCEDURE — 80053 COMPREHEN METABOLIC PANEL: CPT

## 2024-09-04 PROCEDURE — 36415 COLL VENOUS BLD VENIPUNCTURE: CPT

## 2024-09-04 PROCEDURE — 85025 COMPLETE CBC W/AUTO DIFF WBC: CPT

## 2024-09-04 PROCEDURE — 84260 ASSAY OF SEROTONIN: CPT

## 2024-09-04 PROCEDURE — 85610 PROTHROMBIN TIME: CPT

## 2024-09-04 PROCEDURE — 83690 ASSAY OF LIPASE: CPT

## 2024-09-05 ENCOUNTER — HOSPITAL ENCOUNTER (OUTPATIENT)
Age: 54
Setting detail: SPECIMEN
Discharge: HOME | End: 2024-09-05
Payer: COMMERCIAL

## 2024-09-05 PROCEDURE — 87328 CRYPTOSPORIDIUM AG IA: CPT

## 2024-09-05 PROCEDURE — 83993 ASSAY FOR CALPROTECTIN FECAL: CPT

## 2024-09-05 PROCEDURE — 82705 FATS/LIPIDS FECES QUAL: CPT

## 2024-09-05 PROCEDURE — 87506 IADNA-DNA/RNA PROBE TQ 6-11: CPT

## 2024-09-06 LAB
CRYPTOSP AG STL QL IA: NORMAL
E HISTOLYT AG STL QL IA: NORMAL
G LAMBLIA AG STL QL IA: NORMAL
GI PATHOGENS PNL STL NAA+PROBE: NORMAL
SEROTONIN SER-MCNC: 1274 NG/ML (ref 50–220)

## 2024-09-07 LAB
FAT STL QL: ABNORMAL
NEUTRAL FAT STL QL: ABNORMAL

## 2024-09-08 LAB — CALPROTECTIN STL-MCNT: 48 UG/G

## 2025-03-05 ENCOUNTER — HOSPITAL ENCOUNTER (OUTPATIENT)
Dept: CT IMAGING | Age: 55
Discharge: HOME | End: 2025-03-05
Attending: INTERNAL MEDICINE
Payer: COMMERCIAL

## 2025-03-05 DIAGNOSIS — I71.21 ANEURYSM OF ASCENDING AORTA WITHOUT RUPTURE: ICD-10-CM

## 2025-03-05 PROCEDURE — 71275 CT ANGIOGRAPHY CHEST: CPT

## 2025-03-05 PROCEDURE — 6360000004 HC RX CONTRAST MEDICATION: Performed by: INTERNAL MEDICINE

## 2025-03-05 RX ORDER — IOPAMIDOL 755 MG/ML
75 INJECTION, SOLUTION INTRAVASCULAR
Status: COMPLETED | OUTPATIENT
Start: 2025-03-05 | End: 2025-03-05

## 2025-03-05 RX ADMIN — IOPAMIDOL 75 ML: 755 INJECTION, SOLUTION INTRAVENOUS at 09:01
